# Patient Record
Sex: FEMALE | Race: WHITE | NOT HISPANIC OR LATINO | Employment: OTHER | URBAN - METROPOLITAN AREA
[De-identification: names, ages, dates, MRNs, and addresses within clinical notes are randomized per-mention and may not be internally consistent; named-entity substitution may affect disease eponyms.]

---

## 2017-09-21 PROBLEM — R10.9 ABDOMINAL PAIN: Status: ACTIVE | Noted: 2017-09-21

## 2017-09-22 PROBLEM — E83.52 HYPERCALCEMIA: Status: ACTIVE | Noted: 2017-09-22

## 2018-03-13 PROBLEM — R29.6 FREQUENT FALLS: Status: ACTIVE | Noted: 2018-03-13

## 2018-03-13 PROBLEM — I95.9 HYPOTENSION: Status: ACTIVE | Noted: 2018-03-13

## 2018-03-13 PROBLEM — R55 SYNCOPE: Status: ACTIVE | Noted: 2018-03-13

## 2018-03-13 PROBLEM — N17.9 AKI (ACUTE KIDNEY INJURY): Status: ACTIVE | Noted: 2018-03-13
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
## 2018-04-10 PROBLEM — R53.1 WEAKNESS: Status: ACTIVE | Noted: 2018-04-10

## 2018-04-25 PROBLEM — R00.1 BRADYCARDIA: Status: ACTIVE | Noted: 2018-04-25

## 2018-04-27 PROBLEM — I48.0 PAROXYSMAL ATRIAL FIBRILLATION: Status: ACTIVE | Noted: 2018-04-27

## 2018-04-30 PROBLEM — I25.10 CAD (CORONARY ARTERY DISEASE): Status: ACTIVE | Noted: 2018-04-30

## 2018-05-02 PROBLEM — Z53.1 REFUSAL OF BLOOD TRANSFUSIONS AS PATIENT IS JEHOVAH'S WITNESS: Status: ACTIVE | Noted: 2018-05-02

## 2018-05-04 ENCOUNTER — HOSPITAL ENCOUNTER (INPATIENT)
Facility: HOSPITAL | Age: 78
LOS: 6 days | Discharge: HOME | DRG: 247 | End: 2018-05-10
Attending: HOSPITALIST | Admitting: HOSPITALIST
Payer: MEDICARE

## 2018-05-04 DIAGNOSIS — I10 ESSENTIAL HYPERTENSION: Chronic | ICD-10-CM

## 2018-05-04 DIAGNOSIS — I25.10 CAD (CORONARY ARTERY DISEASE): ICD-10-CM

## 2018-05-04 DIAGNOSIS — N18.2 TYPE 2 DIABETES MELLITUS WITH STAGE 2 CHRONIC KIDNEY DISEASE, WITHOUT LONG-TERM CURRENT USE OF INSULIN: Chronic | ICD-10-CM

## 2018-05-04 DIAGNOSIS — I25.10 CORONARY ARTERY DISEASE INVOLVING NATIVE CORONARY ARTERY OF NATIVE HEART WITHOUT ANGINA PECTORIS: ICD-10-CM

## 2018-05-04 DIAGNOSIS — R07.89 CHEST DISCOMFORT: ICD-10-CM

## 2018-05-04 DIAGNOSIS — G89.29 CHRONIC BILATERAL LOW BACK PAIN, WITH SCIATICA PRESENCE UNSPECIFIED: Chronic | ICD-10-CM

## 2018-05-04 DIAGNOSIS — I48.92 ATRIAL FLUTTER: ICD-10-CM

## 2018-05-04 DIAGNOSIS — M54.5 CHRONIC BILATERAL LOW BACK PAIN, WITH SCIATICA PRESENCE UNSPECIFIED: Chronic | ICD-10-CM

## 2018-05-04 DIAGNOSIS — E11.22 TYPE 2 DIABETES MELLITUS WITH STAGE 2 CHRONIC KIDNEY DISEASE, WITHOUT LONG-TERM CURRENT USE OF INSULIN: Chronic | ICD-10-CM

## 2018-05-04 DIAGNOSIS — E78.49 OTHER HYPERLIPIDEMIA: Primary | ICD-10-CM

## 2018-05-04 DIAGNOSIS — I48.0 PAROXYSMAL A-FIB: ICD-10-CM

## 2018-05-04 DIAGNOSIS — E78.5 HYPERLIPIDEMIA, UNSPECIFIED HYPERLIPIDEMIA TYPE: ICD-10-CM

## 2018-05-04 LAB
ABO + RH BLD: NORMAL
ALBUMIN SERPL BCP-MCNC: 3.1 G/DL
ALP SERPL-CCNC: 85 U/L
ALT SERPL W/O P-5'-P-CCNC: 14 U/L
ANION GAP SERPL CALC-SCNC: 7 MMOL/L
AST SERPL-CCNC: 13 U/L
BASOPHILS # BLD AUTO: 0.06 K/UL
BASOPHILS NFR BLD: 0.6 %
BILIRUB SERPL-MCNC: 1.1 MG/DL
BLD GP AB SCN CELLS X3 SERPL QL: NORMAL
BNP SERPL-MCNC: 82 PG/ML
BUN SERPL-MCNC: 28 MG/DL
CALCIUM SERPL-MCNC: 9.8 MG/DL
CHLORIDE SERPL-SCNC: 105 MMOL/L
CHOLEST SERPL-MCNC: 113 MG/DL
CHOLEST/HDLC SERPL: 2.8 {RATIO}
CO2 SERPL-SCNC: 28 MMOL/L
CREAT SERPL-MCNC: 1 MG/DL
DIFFERENTIAL METHOD: ABNORMAL
EOSINOPHIL # BLD AUTO: 0.2 K/UL
EOSINOPHIL NFR BLD: 2.4 %
ERYTHROCYTE [DISTWIDTH] IN BLOOD BY AUTOMATED COUNT: 14 %
EST. GFR  (AFRICAN AMERICAN): >60 ML/MIN/1.73 M^2
EST. GFR  (NON AFRICAN AMERICAN): 54.9 ML/MIN/1.73 M^2
ESTIMATED AVG GLUCOSE: 111 MG/DL
ESTIMATED PA SYSTOLIC PRESSURE: 27.8
FACT X PPP CHRO-ACNC: 0.16 IU/ML
FACT X PPP CHRO-ACNC: 0.22 IU/ML
FACT X PPP CHRO-ACNC: 0.42 IU/ML
GLUCOSE SERPL-MCNC: 119 MG/DL
HBA1C MFR BLD HPLC: 5.5 %
HCT VFR BLD AUTO: 35.9 %
HDLC SERPL-MCNC: 40 MG/DL
HDLC SERPL: 35.4 %
HGB BLD-MCNC: 11.2 G/DL
IMM GRANULOCYTES # BLD AUTO: 0.03 K/UL
IMM GRANULOCYTES NFR BLD AUTO: 0.3 %
INR PPP: 1
LDLC SERPL CALC-MCNC: 47.4 MG/DL
LYMPHOCYTES # BLD AUTO: 4.2 K/UL
LYMPHOCYTES NFR BLD: 43.9 %
MAGNESIUM SERPL-MCNC: 1.8 MG/DL
MCH RBC QN AUTO: 29.2 PG
MCHC RBC AUTO-ENTMCNC: 31.2 G/DL
MCV RBC AUTO: 94 FL
MITRAL VALVE REGURGITATION: NORMAL
MONOCYTES # BLD AUTO: 1.1 K/UL
MONOCYTES NFR BLD: 11.7 %
NEUTROPHILS # BLD AUTO: 4 K/UL
NEUTROPHILS NFR BLD: 41.1 %
NONHDLC SERPL-MCNC: 73 MG/DL
NRBC BLD-RTO: 0 /100 WBC
PHOSPHATE SERPL-MCNC: 3.6 MG/DL
PLATELET # BLD AUTO: 291 K/UL
PMV BLD AUTO: 11.7 FL
POCT GLUCOSE: 120 MG/DL (ref 70–110)
POCT GLUCOSE: 137 MG/DL (ref 70–110)
POCT GLUCOSE: 137 MG/DL (ref 70–110)
POCT GLUCOSE: 159 MG/DL (ref 70–110)
POTASSIUM SERPL-SCNC: 4 MMOL/L
PROT SERPL-MCNC: 5.7 G/DL
PROTHROMBIN TIME: 10.9 SEC
RBC # BLD AUTO: 3.84 M/UL
RETIRED EF AND QEF - SEE NOTES: 68 (ref 55–65)
SODIUM SERPL-SCNC: 140 MMOL/L
T4 FREE SERPL-MCNC: 1.19 NG/DL
TRICUSPID VALVE REGURGITATION: NORMAL
TRIGL SERPL-MCNC: 128 MG/DL
TROPONIN I SERPL DL<=0.01 NG/ML-MCNC: <0.006 NG/ML
TSH SERPL DL<=0.005 MIU/L-ACNC: 0.95 UIU/ML
WBC # BLD AUTO: 9.62 K/UL

## 2018-05-04 PROCEDURE — 85610 PROTHROMBIN TIME: CPT

## 2018-05-04 PROCEDURE — 99223 1ST HOSP IP/OBS HIGH 75: CPT | Mod: AI,,, | Performed by: HOSPITALIST

## 2018-05-04 PROCEDURE — 86850 RBC ANTIBODY SCREEN: CPT

## 2018-05-04 PROCEDURE — 93306 TTE W/DOPPLER COMPLETE: CPT | Mod: 26,,, | Performed by: INTERNAL MEDICINE

## 2018-05-04 PROCEDURE — 84443 ASSAY THYROID STIM HORMONE: CPT

## 2018-05-04 PROCEDURE — 25000003 PHARM REV CODE 250: Performed by: NURSE PRACTITIONER

## 2018-05-04 PROCEDURE — 93010 ELECTROCARDIOGRAM REPORT: CPT | Mod: ,,, | Performed by: INTERNAL MEDICINE

## 2018-05-04 PROCEDURE — 84439 ASSAY OF FREE THYROXINE: CPT

## 2018-05-04 PROCEDURE — 36415 COLL VENOUS BLD VENIPUNCTURE: CPT

## 2018-05-04 PROCEDURE — 25000003 PHARM REV CODE 250: Performed by: HOSPITALIST

## 2018-05-04 PROCEDURE — 83880 ASSAY OF NATRIURETIC PEPTIDE: CPT

## 2018-05-04 PROCEDURE — 93306 TTE W/DOPPLER COMPLETE: CPT

## 2018-05-04 PROCEDURE — 83735 ASSAY OF MAGNESIUM: CPT

## 2018-05-04 PROCEDURE — 85025 COMPLETE CBC W/AUTO DIFF WBC: CPT

## 2018-05-04 PROCEDURE — 83036 HEMOGLOBIN GLYCOSYLATED A1C: CPT

## 2018-05-04 PROCEDURE — 84100 ASSAY OF PHOSPHORUS: CPT

## 2018-05-04 PROCEDURE — 85520 HEPARIN ASSAY: CPT | Mod: 91

## 2018-05-04 PROCEDURE — 84484 ASSAY OF TROPONIN QUANT: CPT

## 2018-05-04 PROCEDURE — 80053 COMPREHEN METABOLIC PANEL: CPT

## 2018-05-04 PROCEDURE — 63600175 PHARM REV CODE 636 W HCPCS: Performed by: HOSPITALIST

## 2018-05-04 PROCEDURE — 80061 LIPID PANEL: CPT

## 2018-05-04 PROCEDURE — 25000003 PHARM REV CODE 250: Performed by: STUDENT IN AN ORGANIZED HEALTH CARE EDUCATION/TRAINING PROGRAM

## 2018-05-04 PROCEDURE — 20600001 HC STEP DOWN PRIVATE ROOM

## 2018-05-04 PROCEDURE — 93005 ELECTROCARDIOGRAM TRACING: CPT

## 2018-05-04 RX ORDER — PANTOPRAZOLE SODIUM 40 MG/1
40 TABLET, DELAYED RELEASE ORAL DAILY
Status: DISCONTINUED | OUTPATIENT
Start: 2018-05-04 | End: 2018-05-10 | Stop reason: HOSPADM

## 2018-05-04 RX ORDER — DIPHENHYDRAMINE HCL 50 MG
50 CAPSULE ORAL EVERY 6 HOURS
Status: COMPLETED | OUTPATIENT
Start: 2018-05-06 | End: 2018-05-07

## 2018-05-04 RX ORDER — IPRATROPIUM BROMIDE AND ALBUTEROL SULFATE 2.5; .5 MG/3ML; MG/3ML
3 SOLUTION RESPIRATORY (INHALATION) EVERY 4 HOURS PRN
Status: DISCONTINUED | OUTPATIENT
Start: 2018-05-04 | End: 2018-05-10 | Stop reason: HOSPADM

## 2018-05-04 RX ORDER — CARVEDILOL 12.5 MG/1
12.5 TABLET ORAL 2 TIMES DAILY
Status: DISCONTINUED | OUTPATIENT
Start: 2018-05-04 | End: 2018-05-05

## 2018-05-04 RX ORDER — OXYBUTYNIN CHLORIDE 5 MG/1
5 TABLET, EXTENDED RELEASE ORAL
Status: DISCONTINUED | OUTPATIENT
Start: 2018-05-04 | End: 2018-05-10 | Stop reason: HOSPADM

## 2018-05-04 RX ORDER — METOPROLOL SUCCINATE 25 MG/1
50 TABLET, EXTENDED RELEASE ORAL DAILY
Status: DISCONTINUED | OUTPATIENT
Start: 2018-05-04 | End: 2018-05-04

## 2018-05-04 RX ORDER — LISINOPRIL 20 MG/1
20 TABLET ORAL DAILY
Status: DISCONTINUED | OUTPATIENT
Start: 2018-05-04 | End: 2018-05-07

## 2018-05-04 RX ORDER — GABAPENTIN 300 MG/1
300 CAPSULE ORAL 3 TIMES DAILY
Status: DISCONTINUED | OUTPATIENT
Start: 2018-05-04 | End: 2018-05-04

## 2018-05-04 RX ORDER — CLOPIDOGREL 300 MG/1
600 TABLET, FILM COATED ORAL ONCE
Status: COMPLETED | OUTPATIENT
Start: 2018-05-04 | End: 2018-05-04

## 2018-05-04 RX ORDER — HEPARIN SODIUM,PORCINE/D5W 25000/250
12 INTRAVENOUS SOLUTION INTRAVENOUS CONTINUOUS
Status: DISCONTINUED | OUTPATIENT
Start: 2018-05-04 | End: 2018-05-10

## 2018-05-04 RX ORDER — TIZANIDINE 4 MG/1
4 TABLET ORAL 3 TIMES DAILY PRN
Status: DISCONTINUED | OUTPATIENT
Start: 2018-05-04 | End: 2018-05-10 | Stop reason: HOSPADM

## 2018-05-04 RX ORDER — CLOPIDOGREL BISULFATE 75 MG/1
75 TABLET ORAL DAILY
Status: DISCONTINUED | OUTPATIENT
Start: 2018-05-05 | End: 2018-05-10 | Stop reason: HOSPADM

## 2018-05-04 RX ORDER — ASPIRIN 81 MG/1
81 TABLET ORAL DAILY
Status: DISCONTINUED | OUTPATIENT
Start: 2018-05-04 | End: 2018-05-10 | Stop reason: HOSPADM

## 2018-05-04 RX ORDER — FAMOTIDINE 20 MG/1
20 TABLET, FILM COATED ORAL 2 TIMES DAILY
Status: COMPLETED | OUTPATIENT
Start: 2018-05-06 | End: 2018-05-07

## 2018-05-04 RX ORDER — INSULIN ASPART 100 [IU]/ML
0-5 INJECTION, SOLUTION INTRAVENOUS; SUBCUTANEOUS EVERY 6 HOURS PRN
Status: DISCONTINUED | OUTPATIENT
Start: 2018-05-04 | End: 2018-05-10 | Stop reason: HOSPADM

## 2018-05-04 RX ORDER — CETIRIZINE HYDROCHLORIDE 5 MG/1
10 TABLET ORAL DAILY
Status: DISCONTINUED | OUTPATIENT
Start: 2018-05-04 | End: 2018-05-10 | Stop reason: HOSPADM

## 2018-05-04 RX ORDER — GABAPENTIN 400 MG/1
800 CAPSULE ORAL 4 TIMES DAILY
Status: DISCONTINUED | OUTPATIENT
Start: 2018-05-04 | End: 2018-05-09

## 2018-05-04 RX ORDER — ATORVASTATIN CALCIUM 10 MG/1
10 TABLET, FILM COATED ORAL NIGHTLY
Status: DISCONTINUED | OUTPATIENT
Start: 2018-05-04 | End: 2018-05-04

## 2018-05-04 RX ORDER — ATORVASTATIN CALCIUM 20 MG/1
40 TABLET, FILM COATED ORAL NIGHTLY
Status: DISCONTINUED | OUTPATIENT
Start: 2018-05-04 | End: 2018-05-10 | Stop reason: HOSPADM

## 2018-05-04 RX ORDER — AMOXICILLIN 250 MG
2 CAPSULE ORAL 2 TIMES DAILY PRN
Status: DISCONTINUED | OUTPATIENT
Start: 2018-05-04 | End: 2018-05-10 | Stop reason: HOSPADM

## 2018-05-04 RX ORDER — HEPARIN SODIUM 5000 [USP'U]/ML
5000 INJECTION, SOLUTION INTRAVENOUS; SUBCUTANEOUS EVERY 8 HOURS
Status: DISCONTINUED | OUTPATIENT
Start: 2018-05-04 | End: 2018-05-04

## 2018-05-04 RX ORDER — GLUCAGON 1 MG
1 KIT INJECTION
Status: DISCONTINUED | OUTPATIENT
Start: 2018-05-04 | End: 2018-05-10 | Stop reason: HOSPADM

## 2018-05-04 RX ORDER — IBUPROFEN 200 MG
24 TABLET ORAL
Status: DISCONTINUED | OUTPATIENT
Start: 2018-05-04 | End: 2018-05-10 | Stop reason: HOSPADM

## 2018-05-04 RX ORDER — ACETAMINOPHEN 325 MG/1
650 TABLET ORAL EVERY 4 HOURS PRN
Status: DISCONTINUED | OUTPATIENT
Start: 2018-05-04 | End: 2018-05-09

## 2018-05-04 RX ORDER — CHOLECALCIFEROL (VITAMIN D3) 25 MCG
1000 TABLET ORAL DAILY
Status: DISCONTINUED | OUTPATIENT
Start: 2018-05-04 | End: 2018-05-10 | Stop reason: HOSPADM

## 2018-05-04 RX ORDER — SODIUM CHLORIDE 0.9 % (FLUSH) 0.9 %
5 SYRINGE (ML) INJECTION
Status: DISCONTINUED | OUTPATIENT
Start: 2018-05-04 | End: 2018-05-10 | Stop reason: HOSPADM

## 2018-05-04 RX ORDER — IBUPROFEN 200 MG
16 TABLET ORAL
Status: DISCONTINUED | OUTPATIENT
Start: 2018-05-04 | End: 2018-05-10 | Stop reason: HOSPADM

## 2018-05-04 RX ORDER — ONDANSETRON 8 MG/1
8 TABLET, ORALLY DISINTEGRATING ORAL EVERY 8 HOURS PRN
Status: DISCONTINUED | OUTPATIENT
Start: 2018-05-04 | End: 2018-05-10 | Stop reason: HOSPADM

## 2018-05-04 RX ORDER — CITALOPRAM 10 MG/1
10 TABLET ORAL DAILY
Status: DISCONTINUED | OUTPATIENT
Start: 2018-05-04 | End: 2018-05-10 | Stop reason: HOSPADM

## 2018-05-04 RX ADMIN — OXYBUTYNIN CHLORIDE 5 MG: 5 TABLET, FILM COATED, EXTENDED RELEASE ORAL at 05:05

## 2018-05-04 RX ADMIN — VITAMIN D, TAB 1000IU (100/BT) 1000 UNITS: 25 TAB at 09:05

## 2018-05-04 RX ADMIN — CLOPIDOGREL BISULFATE 600 MG: 300 TABLET, FILM COATED ORAL at 08:05

## 2018-05-04 RX ADMIN — ASPIRIN 81 MG: 81 TABLET, COATED ORAL at 09:05

## 2018-05-04 RX ADMIN — ATORVASTATIN CALCIUM 40 MG: 20 TABLET, FILM COATED ORAL at 08:05

## 2018-05-04 RX ADMIN — GABAPENTIN 800 MG: 400 CAPSULE ORAL at 05:05

## 2018-05-04 RX ADMIN — CITALOPRAM HYDROBROMIDE 10 MG: 10 TABLET ORAL at 09:05

## 2018-05-04 RX ADMIN — GABAPENTIN 800 MG: 400 CAPSULE ORAL at 08:05

## 2018-05-04 RX ADMIN — TIZANIDINE 4 MG: 4 TABLET ORAL at 05:05

## 2018-05-04 RX ADMIN — STANDARDIZED SENNA CONCENTRATE AND DOCUSATE SODIUM 2 TABLET: 8.6; 5 TABLET, FILM COATED ORAL at 09:05

## 2018-05-04 RX ADMIN — CETIRIZINE HYDROCHLORIDE 10 MG: 5 TABLET, FILM COATED ORAL at 09:05

## 2018-05-04 RX ADMIN — CARVEDILOL 12.5 MG: 12.5 TABLET, FILM COATED ORAL at 09:05

## 2018-05-04 RX ADMIN — ACETAMINOPHEN 650 MG: 325 TABLET ORAL at 09:05

## 2018-05-04 RX ADMIN — PANTOPRAZOLE SODIUM 40 MG: 40 TABLET, DELAYED RELEASE ORAL at 09:05

## 2018-05-04 RX ADMIN — THERA TABS 1 TABLET: TAB at 09:05

## 2018-05-04 RX ADMIN — LISINOPRIL 20 MG: 20 TABLET ORAL at 09:05

## 2018-05-04 RX ADMIN — HEPARIN SODIUM 12 UNITS/KG/HR: 10000 INJECTION, SOLUTION INTRAVENOUS at 06:05

## 2018-05-04 RX ADMIN — GABAPENTIN 300 MG: 300 CAPSULE ORAL at 09:05

## 2018-05-04 RX ADMIN — CARVEDILOL 12.5 MG: 12.5 TABLET, FILM COATED ORAL at 08:05

## 2018-05-04 RX ADMIN — HEPARIN SODIUM 14 UNITS/KG/HR: 10000 INJECTION, SOLUTION INTRAVENOUS at 04:05

## 2018-05-04 NOTE — PLAN OF CARE
05/04/18 1112   Discharge Assessment   Assessment Type Discharge Planning Assessment   Confirmed/corrected address and phone number on facesheet? Yes   Assessment information obtained from? Patient;Medical Record   Expected Length of Stay (days) 4   Communicated expected length of stay with patient/caregiver yes   Prior to hospitilization cognitive status: Alert/Oriented   Prior to hospitalization functional status: Assistive Equipment;Needs Assistance   Current cognitive status: Alert/Oriented   Current Functional Status: Assistive Equipment;Needs Assistance   Facility Arrived From: [facility] With sibling(s)   Able to Return to Prior Arrangements yes   Is patient able to care for self after discharge? Yes   Patient's perception of discharge disposition home or selfcare   Readmission Within The Last 30 Days no previous admission in last 30 days   Patient currently being followed by outpatient case management? No   Patient currently receives any other outside agency services? No   Equipment Currently Used at Home rollator;cane, straight;wheelchair;bedside commode;raised toilet;grab bar   Do you have any problems affording any of your prescribed medications? No   Is the patient taking medications as prescribed? yes   Does the patient have transportation home? Yes   Transportation Available family or friend will provide   Does the patient receive services at the [facility]? No   Discharge Plan A Home with family   Discharge Plan B Home with family;Home Health   Patient/Family In Agreement With Plan yes   Transferred from [facility] for CAD-CTS evaluation. Lives with sister and requires assistance in her ADLs. Plan is to DC home. No DC needs identified at this time.

## 2018-05-04 NOTE — PLAN OF CARE
Problem: Patient Care Overview  Goal: Plan of Care Review  Outcome: Revised  Pt free of falls/traumas/injuries. Denies c/o SOB or CP.  Chronic back pain and neuropathy managed with PO analgesics and Neurontin. Heparin gtt infusing for parox afib; pt remains sinus bimal.  Toprol XL switched to Coreg BID; BP and HR remain stable.  Blood glucose diet controlled; no supplemental insulin required.  2D echo performed; results as listed in chart.  CTS and Interventional Cardiology following.  Family at the bedside.  Pt and family tolerating plan of care.

## 2018-05-04 NOTE — NURSING
Pt arrived via [facility] at 0322am  No c/o noted at this time will page MD for further orders.

## 2018-05-04 NOTE — SUBJECTIVE & OBJECTIVE
Interval History: Looking forward to procedure to fix her heart.  Only c/o is back pain.  Patient had episode of hypotension post coreg administration.  Bolused with IVF 500ml x 2.     Review of Systems   Constitutional: Negative for activity change, appetite change and fatigue.   HENT: Negative for congestion, sore throat and trouble swallowing.    Respiratory: Negative for cough and shortness of breath.    Cardiovascular: Negative for chest pain, palpitations and leg swelling.   Gastrointestinal: Negative for abdominal pain, constipation, diarrhea, nausea and vomiting.   Genitourinary: Negative for decreased urine volume, difficulty urinating and hematuria.   Musculoskeletal: Positive for back pain. Negative for myalgias.   Neurological: Negative for dizziness, weakness, light-headedness, numbness and headaches.   Hematological: Does not bruise/bleed easily.   Psychiatric/Behavioral: Negative for agitation, decreased concentration and sleep disturbance. The patient is not nervous/anxious.      Objective:     Vital Signs (Most Recent):  Temp: 98.3 °F (36.8 °C) (05/04/18 0805)  Pulse: (!) 52 (05/04/18 0805)  Resp: 18 (05/04/18 0805)  BP: (!) 160/71 (05/04/18 0805)  SpO2: 98 % (05/04/18 0805) Vital Signs (24h Range):  Temp:  [97.2 °F (36.2 °C)-98.3 °F (36.8 °C)] 98.3 °F (36.8 °C)  Pulse:  [52-63] 52  Resp:  [18-19] 18  SpO2:  [96 %-98 %] 98 %  BP: (134-164)/(64-76) 160/71     Weight: 80.6 kg (177 lb 9.6 oz)  Body mass index is 27 kg/m².  No intake or output data in the 24 hours ending 05/04/18 0900   Physical Exam   Constitutional: She is oriented to person, place, and time. She appears well-developed and well-nourished. No distress.   HENT:   Head: Normocephalic and atraumatic.   Right Ear: External ear normal.   Left Ear: External ear normal.   Nose: Nose normal.   Eyes: Conjunctivae and EOM are normal.   Neck: Normal range of motion. Neck supple. No JVD present.   Cardiovascular: Normal rate, regular rhythm,  normal heart sounds and intact distal pulses.    No murmur heard.  Pulmonary/Chest: Effort normal and breath sounds normal. No respiratory distress. She has no wheezes. She has no rales.   Abdominal: Soft. Bowel sounds are normal. She exhibits no distension and no mass. There is no tenderness. There is no guarding.   Musculoskeletal: Normal range of motion. She exhibits no edema.   Neurological: She is alert and oriented to person, place, and time. No cranial nerve deficit or sensory deficit. Coordination normal.   Skin: Skin is warm and dry. Capillary refill takes 2 to 3 seconds. No rash noted. She is not diaphoretic. No erythema.   Psychiatric: She has a normal mood and affect. Her behavior is normal. Judgment and thought content normal.   Nursing note and vitals reviewed.      Significant Labs:   CBC:   Recent Labs  Lab 05/03/18  0448 05/04/18  0528   WBC 11.00 9.62   HGB 11.9* 11.2*   HCT 35.0* 35.9*    291     CMP:   Recent Labs  Lab 05/03/18 0448 05/04/18  0529    140   K 3.9 4.0    105   CO2 30* 28   * 119*   BUN 29* 28*   CREATININE 1.00 1.0   CALCIUM 9.4 9.8   PROT 5.8* 5.7*   ALBUMIN 3.5 3.1*   BILITOT 1.0 1.1*   ALKPHOS 86 85   AST 19 13   ALT 32 14   ANIONGAP  --  7*   EGFRNONAA 55* 54.9*       Significant Imaging: I have reviewed all pertinent imaging results/findings within the past 24 hours.     2d cfd echo: 5/4/18   1 - Upper limit of normal left ventricular enlargement.     2 - Normal left ventricular systolic function (EF 65-70%).     3 - Severe left atrial enlargement.     4 - No wall motion abnormalities.     5 - Indeterminate LV diastolic function.     6 - Normal right ventricular systolic function .     7 - Trivial to mild mitral regurgitation.     8 - Trivial to mild tricuspid regurgitation.     9 - The estimated PA systolic pressure is 28 mmHg.

## 2018-05-04 NOTE — PROGRESS NOTES
Tele box for room 340 is not available at this time.  Charge nurse notified and trying to track down tele box at this time.

## 2018-05-04 NOTE — H&P
[facility] Hospital Medicine  History & Physical    Patient Name: Elías Muñoz  MRN: [id number]  Admission Date: 5/4/2018  Attending Physician: Al Montes MD   Primary Care Provider: Calin Casas MD    [facility] Medicine Team: [facility]OSP MED  Reuben Alaniz DO     Patient information was obtained from patient, outside records  and ER records.     Subjective:     Principal Problem:CAD (coronary artery disease)    Chief Complaint: No chief complaint on file.       HPI:   78 year old female with h/o HTN, HLD, DM2, paroxysmal afib, CAD, lumbar DJD with chronic low back pain, fall risk due to unsteady gait, breast cancer s/p left mastectomy who is a [employer]. She was admitted to [facility] on 04/25 from clinic with asymptomatic hypotension and bradycardia. Patients in clinic her BP was 60/30 but she felt fine.  EKG on admission showed sinus bradycardia with HR 42 and first degree ABV. Metoprolol was held due to bradycardia. No chest pain and SOB. Troponin negative. Patient developed afib w RVR on 04/27 and started on BB, NOAC pradaxa. Patient was evaluated by cardiology while in hospital for bradycardia and then afib w RVR.      Patient had elective diagnostic LHC on 04/30 which revealed multivessel disease including left main artery, LAD, CFx and RCA. Normal LVSF     Case discussed with Dr. Germain from [facility] and deemed patient is not a CABG candidate due to anatomy. Dr. Germain reviewed the [facility] with Dr. Jose Tellez from interventional cardiology and suggested transfer to [facility] for high risk PCI.      Patient currently on heparin infusion since [facility] w/o evidence of bleeding. She is a [employer] and refusing any blood product transfusion. She has iodine allergy and received prednisone prior to recent [facility] w/o any adverse reaction.               To do list upon patient arrival:  - ADMIT TO [facility]/  - continue GTMT for CAD   - 2D ECHO W Doppler   - telemetry monitoring   - continue  heparin infusion for paroxysmal afib   - interventional cardiology consult for high risk PCI      Past Medical History:   Diagnosis Date    ADARSH (acute kidney injury)     Anticoagulant long-term use     Arthritis     Atrial fibrillation     Atrial flutter     s/p cardioversion with no recurrence    CAD (coronary artery disease) 4/30/2018    Carpal tunnel syndrome     Cataract     CHF (congestive heart failure)     Chronic low back pain     CTS (carpal tunnel syndrome)     Depression     Diabetes mellitus, type 2     Diastolic heart failure     Disorder of kidney and ureter     GERD (gastroesophageal reflux disease)     Hyperlipidemia     Hypertension     Hypotension     Malignant neoplasm of central portion of female breast 8/19/2014    Neuropathy     Stress incontinence     Stress incontinence     Syncope     Syncope        Past Surgical History:   Procedure Laterality Date    ADENOIDECTOMY      BACK SURGERY      BREAST BIOPSY Left     malignant    CATARACT EXTRACTION Left     CHOLECYSTECTOMY      EYE SURGERY Bilateral     cataract    INCONTINENCE SURGERY      MASTECTOMY, RADICAL Left     PORTACATH PLACEMENT Right     TONSILLECTOMY         Review of patient's allergies indicates:   Allergen Reactions    Iodine and iodide containing products Anaphylaxis    Latex, natural rubber Rash    Adhesive      Takes skin off     Morphine      Makes her sick    Penicillins Other (See Comments)    Betadine [povidone-iodine] Rash    Iodinated contrast- oral and iv dye Rash    Lidoderm [lidocaine] Rash     Adhesives on patch not lidocaine       Current Facility-Administered Medications on File Prior to Encounter   Medication    [DISCONTINUED] acetaminophen tablet 650 mg    [DISCONTINUED] albuterol-ipratropium 2.5mg-0.5mg/3mL nebulizer solution 3 mL    [DISCONTINUED] aspirin EC tablet 81 mg    [DISCONTINUED] atorvastatin tablet 40 mg    [DISCONTINUED] citalopram tablet 10 mg     [DISCONTINUED] dextrose 50% injection 12.5 g    [DISCONTINUED] dextrose 50% injection 25 g    [DISCONTINUED] famotidine tablet 20 mg    [DISCONTINUED] famotidine tablet 20 mg    [DISCONTINUED] glucagon (human recombinant) injection 1 mg    [DISCONTINUED] glucose chewable tablet 16 g    [DISCONTINUED] glucose chewable tablet 24 g    [DISCONTINUED] heparin 25,000 units in dextrose 5% (100 units/ml) IV bolus from bag; PRN BOLUS    [DISCONTINUED] heparin 25,000 units in dextrose 5% (100 units/ml) IV bolus from bag; PRN BOLUS    [DISCONTINUED] heparin 25,000 units in dextrose 5% 250 mL (100 units/mL) infusion; FEMALE    [DISCONTINUED] hydrocodone-acetaminophen 10-325mg per tablet 1 tablet    [DISCONTINUED] insulin aspart U-100 injection 1-10 Units    [DISCONTINUED] lisinopril tablet 20 mg    [DISCONTINUED] magnesium oxide tablet 800 mg    [DISCONTINUED] magnesium oxide tablet 800 mg    [DISCONTINUED] metoprolol succinate (TOPROL-XL) 24 hr tablet 50 mg    [DISCONTINUED] ondansetron disintegrating tablet 4 mg    [DISCONTINUED] ondansetron injection 8 mg    [DISCONTINUED] pantoprazole EC tablet 40 mg    [DISCONTINUED] potassium chloride 10% oral solution 40 mEq    [DISCONTINUED] potassium chloride 10% oral solution 40 mEq    [DISCONTINUED] potassium chloride 10% oral solution 40 mEq    [DISCONTINUED] potassium, sodium phosphates 280-160-250 mg packet 2 packet    [DISCONTINUED] potassium, sodium phosphates 280-160-250 mg packet 2 packet    [DISCONTINUED] potassium, sodium phosphates 280-160-250 mg packet 2 packet    [DISCONTINUED] predniSONE tablet 50 mg    [DISCONTINUED] promethazine (PHENERGAN) 6.25 mg in dextrose 5 % 50 mL IVPB    [DISCONTINUED] senna-docusate 8.6-50 mg per tablet 1 tablet    [DISCONTINUED] sodium chloride 0.9% flush 5 mL     Current Outpatient Prescriptions on File Prior to Encounter   Medication Sig    aspirin (ECOTRIN) 81 MG EC tablet Take 1 tablet (81 mg total) by mouth  once daily.    atorvastatin (LIPITOR) 10 MG tablet TAKE 1 TABLET BY MOUTH EVERY EVENING (Patient taking differently: TAKE 1 TABLET(10mg)BY MOUTH EVERY EVENING)    citalopram (CELEXA) 10 MG tablet Take 1 tablet (10 mg total) by mouth once daily.    gabapentin (NEURONTIN) 800 MG tablet Take 1 tablet (800 mg total) by mouth 4 (four) times daily.    hydrocodone-acetaminophen 10-325mg (NORCO)  mg Tab Take 1 tablet by mouth every 6 (six) hours as needed for Pain (breakthrough pain only).    lisinopril (PRINIVIL,ZESTRIL) 20 MG tablet Take 1 tablet (20 mg total) by mouth once daily.    loratadine (CLARITIN) 10 mg tablet Take 1 tablet (10 mg total) by mouth once daily.    metoprolol succinate (TOPROL-XL) 50 MG 24 hr tablet Take 1 tablet (50 mg total) by mouth once daily.    multivitamin capsule Take 1 capsule by mouth once daily.    omeprazole (PRILOSEC) 40 MG capsule Take 1 capsule (40 mg total) by mouth once daily.    ondansetron (ZOFRAN-ODT) 4 MG TbDL Take 1 tablet (4 mg total) by mouth every 6 (six) hours as needed.    oxybutynin (DITROPAN-XL) 5 MG TR24 TAKE 1 TABLET BY MOUTH EVERY DAY AFTER DINNER (Patient taking differently: TAKE 1 TABLET(5mg) BY MOUTH EVERY DAY AFTER DINNER)    tiZANidine (ZANAFLEX) 4 MG tablet TAKE 1 TABLET BY MOUTH THREE TIMES DAILY AS NEEDED FOR MUSCLE SPASMS (Patient taking differently: TAKE 1 TABLET(4mg)BY MOUTH THREE TIMES DAILY AS NEEDED FOR MUSCLE SPASMS)    traMADol (ULTRAM) 50 mg tablet Take 1 tablet (50 mg total) by mouth every 6 (six) hours as needed.    vitamin D 1000 units Tab Take 1,000 Units by mouth once daily.     Family History     Problem Relation (Age of Onset)    Cancer Sister    Diabetes Father, Brother    Heart disease Mother, Father    Hyperlipidemia Brother    Hypertension Brother    Thyroid disease Sister        Social History Main Topics    Smoking status: Former Smoker     Packs/day: 1.00     Years: 43.00     Types: Cigarettes     Start date: 9/19/1957      Quit date: 9/19/2010    Smokeless tobacco: Never Used    Alcohol use No    Drug use: No    Sexual activity: No     Review of Systems   Constitutional: Negative for activity change, appetite change, chills, diaphoresis, fatigue and fever.   HENT: Negative for congestion, dental problem, drooling, ear discharge, ear pain, facial swelling, hearing loss, mouth sores, nosebleeds, postnasal drip, rhinorrhea, sinus pressure, sneezing, sore throat, tinnitus, trouble swallowing and voice change.    Eyes: Negative for photophobia, pain, discharge, redness, itching and visual disturbance.   Respiratory: Negative for apnea, cough, choking, chest tightness, shortness of breath, wheezing and stridor.    Cardiovascular: Negative for chest pain, palpitations and leg swelling.   Gastrointestinal: Negative for abdominal distention, abdominal pain, anal bleeding, blood in stool, constipation, diarrhea, nausea, rectal pain and vomiting.   Endocrine: Negative for cold intolerance, heat intolerance, polydipsia, polyphagia and polyuria.   Genitourinary: Negative for decreased urine volume, difficulty urinating, dyspareunia, dysuria, enuresis, flank pain, frequency, genital sores, hematuria, menstrual problem, pelvic pain, urgency, vaginal bleeding, vaginal discharge and vaginal pain.   Musculoskeletal: Positive for back pain (chronic low back pain ). Negative for arthralgias, joint swelling, myalgias, neck pain and neck stiffness. Gait problem: unsteady gait.   Skin: Negative for color change, pallor, rash and wound.   Allergic/Immunologic: Negative for environmental allergies, food allergies and immunocompromised state.   Neurological: Negative for dizziness, tremors, seizures, syncope, facial asymmetry, speech difficulty, weakness, light-headedness, numbness and headaches.   Hematological: Negative for adenopathy. Does not bruise/bleed easily.   Psychiatric/Behavioral: Negative for agitation, behavioral problems, confusion,  decreased concentration, dysphoric mood, hallucinations, self-injury, sleep disturbance and suicidal ideas. The patient is not nervous/anxious and is not hyperactive.      Objective:     Vital Signs (Most Recent):  Temp: 97.2 °F (36.2 °C) (05/04/18 0322)  Pulse: (!) 53 (05/04/18 0322)  Resp: 19 (05/04/18 0322)  BP: (!) 164/73 (05/04/18 0322)  SpO2: 96 % (05/04/18 0322) Vital Signs (24h Range):  Temp:  [97.2 °F (36.2 °C)-98.5 °F (36.9 °C)] 97.2 °F (36.2 °C)  Pulse:  [53-63] 53  Resp:  [16-19] 19  SpO2:  [96 %-99 %] 96 %  BP: (134-166)/(64-76) 164/73     Weight: 80.6 kg (177 lb 9.6 oz)  Body mass index is 24.09 kg/m².    Physical Exam   Constitutional: She is oriented to person, place, and time. She appears well-developed and well-nourished. No distress.   HENT:   Head: Normocephalic and atraumatic.   Right Ear: External ear normal.   Left Ear: External ear normal.   Nose: Nose normal.   Mouth/Throat: Oropharynx is clear and moist. No oropharyngeal exudate.   Eyes: Conjunctivae and EOM are normal. Pupils are equal, round, and reactive to light. No scleral icterus.   Neck: Neck supple. No JVD present. No tracheal deviation present. No thyromegaly present.   Cardiovascular: Normal rate, regular rhythm and normal heart sounds.  Exam reveals no gallop.    No murmur heard.  Pulmonary/Chest: Effort normal and breath sounds normal. No respiratory distress. She has no wheezes. She has no rales. She exhibits no tenderness.   Abdominal: Soft. Bowel sounds are normal. She exhibits no distension and no mass. There is no tenderness. There is no rebound and no guarding.   Genitourinary: No vaginal discharge found.   Musculoskeletal: She exhibits no edema or tenderness.   Lymphadenopathy:     She has no cervical adenopathy.   Neurological: She is alert and oriented to person, place, and time. She has normal reflexes. She displays normal reflexes. No cranial nerve deficit. She exhibits normal muscle tone. Coordination normal.   Skin:  Skin is warm and dry. No rash noted. She is not diaphoretic. No erythema. No pallor.   Psychiatric: She has a normal mood and affect. Her behavior is normal. Judgment and thought content normal.         CRANIAL NERVES     CN III, IV, VI   Pupils are equal, round, and reactive to light.  Extraocular motions are normal.       Significant Labs:   Recent Results (from the past 48 hour(s))   POCT glucose    Collection Time: 05/02/18 11:44 AM   Result Value Ref Range    POC Glucose 142 (A) 74 - 106 mg/dL   POCT glucose    Collection Time: 05/02/18  5:34 PM   Result Value Ref Range    POC Glucose 138 (A) 74 - 106 mg/dL   POCT glucose    Collection Time: 05/02/18  8:38 PM   Result Value Ref Range    POC Glucose 188 (A) 74 - 106 mg/dL   POCT glucose    Collection Time: 05/03/18  4:39 AM   Result Value Ref Range    POC Glucose 146 (A) 74 - 106 mg/dL   Comprehensive Metabolic Panel (CMP)    Collection Time: 05/03/18  4:48 AM   Result Value Ref Range    Sodium 145 136 - 145 mmol/L    Potassium 3.9 3.5 - 5.1 mmol/L    Chloride 105 95 - 110 mmol/L    CO2 30 (H) 23 - 29 mmol/L    Glucose 128 (H) 74 - 106 mg/dL    BUN, Bld 29 (H) 7 - 17 mg/dL    Creatinine 1.00 0.70 - 1.20 mg/dL    Calcium 9.4 8.4 - 10.2 mg/dL    Total Protein 5.8 (L) 6.3 - 8.2 g/dL    Albumin 3.5 3.5 - 5.2 g/dL    Total Bilirubin 1.0 0.2 - 1.3 mg/dL    Alkaline Phosphatase 86 38 - 145 U/L    AST 19 14 - 36 U/L    ALT 32 10 - 44 U/L    eGFR if African American >60 >60 mL/min/1.73 m^2    eGFR if non African American 55 (A) >60 mL/min/1.73 m^2   Magnesium    Collection Time: 05/03/18  4:48 AM   Result Value Ref Range    Magnesium 1.9 1.6 - 2.6 mg/dL   Phosphorus    Collection Time: 05/03/18  4:48 AM   Result Value Ref Range    Phosphorus 3.00 2.40 - 4.50 mg/dL   CBC with Automated Differential    Collection Time: 05/03/18  4:48 AM   Result Value Ref Range    WBC 11.00 3.90 - 12.70 K/uL    RBC 3.92 (L) 4.00 - 5.40 M/uL    Hemoglobin 11.9 (L) 12.0 - 16.0 g/dL     Hematocrit 35.0 (L) 37.0 - 48.5 %    MCV 90 82 - 98 fL    MCH 30.3 27.0 - 31.0 pg    MCHC 33.8 32.0 - 36.0 g/dL    RDW 14.4 11.5 - 14.5 %    Platelets 279 150 - 350 K/uL    MPV 10.1 9.2 - 12.9 fL    Gran # (ANC) 4.7 1.8 - 7.7 K/uL    Lymph # 4.7 1.0 - 4.8 K/uL    Mono # 1.2 (H) 0.3 - 1.0 K/uL    Eos # 0.2 0.0 - 0.5 K/uL    Baso # 0.10 0.00 - 0.20 K/uL    nRBC 0 0 /100 WBC    Gran% 43.0 38.0 - 73.0 %    Lymph% 43.1 18.0 - 48.0 %    Mono% 11.2 4.0 - 15.0 %    Eosinophil% 1.4 0.0 - 8.0 %    Basophil% 1.3 0.0 - 1.9 %    Differential Method Automated    POCT glucose    Collection Time: 05/03/18 11:34 AM   Result Value Ref Range    POC Glucose 180 (A) 74 - 106 mg/dL   POCT glucose    Collection Time: 05/03/18  5:02 PM   Result Value Ref Range    POC Glucose 123 (A) 74 - 106 mg/dL   POCT glucose    Collection Time: 05/03/18  8:35 PM   Result Value Ref Range    POC Glucose 183 (A) 74 - 106 mg/dL         Significant Imaging: I have reviewed and interpreted all pertinent imaging results/findings within the past 24 hours.     [facility] ( 04/30 ) :     1. Left ventricular end diastolic pressure was 15 mmHg.  2. Left ventricular function: normal  3. LM: 95%.  4. LAD:  Proximal %. LAD fills bidirectionally.  5. CFx:  moderate.  6. RCA:  Proximal mild. Mid vessel 75%. Distal 30%.        Impression:  1. Severe multivessel CAD with critical LM disease.  2. Normal LVSF      Assessment/Plan:     Active Diagnoses:    Diagnosis Date Noted POA    PRINCIPAL PROBLEM:  CAD (coronary artery disease) [I25.10] 04/30/2018 Yes    HTN (hypertension) [I10] 01/02/2015 Yes     Chronic    Chronic low back pain [M54.5, G89.29] 12/12/2014 Yes     Chronic    Diabetes mellitus, type 2 [E11.9]  Yes     Chronic    Hyperlipidemia [E78.5]  Yes      Problems Resolved During this Admission:    Diagnosis Date Noted Date Resolved POA     # Multivessel CAD involving left main and LAD   - had LHC on 04/30 at [facility]   - denies chest pain or dyspnea   - patient  not a candidate for CABG given anatomy of her coronary vessels per Dr. Germain from CTS   - transferred to [facility] for high risk PCI   - continue GDMT with aspirin, beta blocker, statin   - 2D Echo with CFD   - NPO pending interventional cardiology evaluation     # paroxysmal afib   - continue heparin with high [id number] score of 6  - no evidence of bleeding   - rate controlled with metoprolol   - telemetry monitoring     # HTN   - SBP ~160   - continue metoprolol, lisinopril     #DM2  - takes metformin at home   - hold oral agents   - low dose SSI while NPO     # chronic low back pain   - lumbar DJD   - had back surgery last year w/o improvement   - on gabapentin and norco 10 mg prn    - will continue   - fall precaution     VTE Risk Mitigation         Ordered     heparin 25,000 units in dextrose 5% 250 mL (100 units/mL) infusion  Continuous      05/04/18 0457     heparin 25,000 units in dextrose 5% 250 mL (100 units/mL) bolus from bag; ADDITIONAL PRN BOLUS  As needed (PRN)      05/04/18 0457     heparin 25,000 units in dextrose 5% 250 mL (100 units/mL) bolus from bag; ADDITIONAL PRN BOLUS  As needed (PRN)      05/04/18 0457     IP VTE HIGH RISK PATIENT  Once      05/04/18 0453     Place sequential compression device  Until discontinued      05/04/18 0453            Reuben Alaniz DO  Department of Hospital Medicine   [facility]

## 2018-05-05 LAB
BASOPHILS # BLD AUTO: 0.11 K/UL
BASOPHILS NFR BLD: 0.9 %
DIFFERENTIAL METHOD: ABNORMAL
EOSINOPHIL # BLD AUTO: 0.4 K/UL
EOSINOPHIL NFR BLD: 3.1 %
ERYTHROCYTE [DISTWIDTH] IN BLOOD BY AUTOMATED COUNT: 14 %
FACT X PPP CHRO-ACNC: 0.78 IU/ML
FACT X PPP CHRO-ACNC: 0.85 IU/ML
HCT VFR BLD AUTO: 36.7 %
HGB BLD-MCNC: 11.6 G/DL
IMM GRANULOCYTES # BLD AUTO: 0.05 K/UL
IMM GRANULOCYTES NFR BLD AUTO: 0.4 %
LYMPHOCYTES # BLD AUTO: 4.6 K/UL
LYMPHOCYTES NFR BLD: 36.4 %
MCH RBC QN AUTO: 29.4 PG
MCHC RBC AUTO-ENTMCNC: 31.6 G/DL
MCV RBC AUTO: 93 FL
MONOCYTES # BLD AUTO: 1.2 K/UL
MONOCYTES NFR BLD: 9.5 %
NEUTROPHILS # BLD AUTO: 6.3 K/UL
NEUTROPHILS NFR BLD: 49.7 %
NRBC BLD-RTO: 0 /100 WBC
PLATELET # BLD AUTO: 279 K/UL
PMV BLD AUTO: 11 FL
POCT GLUCOSE: 131 MG/DL (ref 70–110)
POCT GLUCOSE: 210 MG/DL (ref 70–110)
RBC # BLD AUTO: 3.94 M/UL
WBC # BLD AUTO: 12.58 K/UL

## 2018-05-05 PROCEDURE — 25000003 PHARM REV CODE 250: Performed by: HOSPITALIST

## 2018-05-05 PROCEDURE — 25000003 PHARM REV CODE 250: Performed by: STUDENT IN AN ORGANIZED HEALTH CARE EDUCATION/TRAINING PROGRAM

## 2018-05-05 PROCEDURE — 63600175 PHARM REV CODE 636 W HCPCS: Performed by: HOSPITALIST

## 2018-05-05 PROCEDURE — 20600001 HC STEP DOWN PRIVATE ROOM

## 2018-05-05 PROCEDURE — 36415 COLL VENOUS BLD VENIPUNCTURE: CPT

## 2018-05-05 PROCEDURE — 85520 HEPARIN ASSAY: CPT | Mod: 91

## 2018-05-05 PROCEDURE — 25000003 PHARM REV CODE 250: Performed by: NURSE PRACTITIONER

## 2018-05-05 PROCEDURE — 85520 HEPARIN ASSAY: CPT

## 2018-05-05 PROCEDURE — 85025 COMPLETE CBC W/AUTO DIFF WBC: CPT

## 2018-05-05 PROCEDURE — 99232 SBSQ HOSP IP/OBS MODERATE 35: CPT | Mod: ,,, | Performed by: NURSE PRACTITIONER

## 2018-05-05 RX ORDER — METOPROLOL TARTRATE 25 MG/1
25 TABLET, FILM COATED ORAL 2 TIMES DAILY
Status: DISCONTINUED | OUTPATIENT
Start: 2018-05-05 | End: 2018-05-07

## 2018-05-05 RX ADMIN — CITALOPRAM HYDROBROMIDE 10 MG: 10 TABLET ORAL at 08:05

## 2018-05-05 RX ADMIN — CETIRIZINE HYDROCHLORIDE 10 MG: 5 TABLET, FILM COATED ORAL at 08:05

## 2018-05-05 RX ADMIN — PANTOPRAZOLE SODIUM 40 MG: 40 TABLET, DELAYED RELEASE ORAL at 08:05

## 2018-05-05 RX ADMIN — TIZANIDINE 4 MG: 4 TABLET ORAL at 08:05

## 2018-05-05 RX ADMIN — OXYBUTYNIN CHLORIDE 5 MG: 5 TABLET, FILM COATED, EXTENDED RELEASE ORAL at 05:05

## 2018-05-05 RX ADMIN — GABAPENTIN 800 MG: 400 CAPSULE ORAL at 01:05

## 2018-05-05 RX ADMIN — METOPROLOL TARTRATE 25 MG: 25 TABLET ORAL at 08:05

## 2018-05-05 RX ADMIN — ATORVASTATIN CALCIUM 40 MG: 20 TABLET, FILM COATED ORAL at 08:05

## 2018-05-05 RX ADMIN — ASPIRIN 81 MG: 81 TABLET, COATED ORAL at 08:05

## 2018-05-05 RX ADMIN — TIZANIDINE 4 MG: 4 TABLET ORAL at 10:05

## 2018-05-05 RX ADMIN — CARVEDILOL 12.5 MG: 12.5 TABLET, FILM COATED ORAL at 08:05

## 2018-05-05 RX ADMIN — GABAPENTIN 800 MG: 400 CAPSULE ORAL at 08:05

## 2018-05-05 RX ADMIN — LISINOPRIL 20 MG: 20 TABLET ORAL at 08:05

## 2018-05-05 RX ADMIN — GABAPENTIN 800 MG: 400 CAPSULE ORAL at 05:05

## 2018-05-05 RX ADMIN — THERA TABS 1 TABLET: TAB at 08:05

## 2018-05-05 RX ADMIN — VITAMIN D, TAB 1000IU (100/BT) 1000 UNITS: 25 TAB at 08:05

## 2018-05-05 RX ADMIN — SODIUM CHLORIDE 500 ML: 0.9 INJECTION, SOLUTION INTRAVENOUS at 01:05

## 2018-05-05 RX ADMIN — INSULIN ASPART 2 UNITS: 100 INJECTION, SOLUTION INTRAVENOUS; SUBCUTANEOUS at 01:05

## 2018-05-05 RX ADMIN — CLOPIDOGREL 75 MG: 75 TABLET, FILM COATED ORAL at 08:05

## 2018-05-05 RX ADMIN — SODIUM CHLORIDE 500 ML: 0.9 INJECTION, SOLUTION INTRAVENOUS at 12:05

## 2018-05-05 NOTE — PROGRESS NOTES
Tech reported hypotension, manual BP low. Pt resting comfortably in bed. Pt reports drowsiness but denies any other complaints. Other VSS. Charge notified. KIP Cheung notified; instructed to administer 500cc bolus over 1hr. Orders implemented as instructed. Pt instructed to call for assistance, bed alarm on fall risk precautions in place. Pt and family verbalized understanding. Will continue to monitor.     05/05/18 1221 05/05/18 1225 05/05/18 1230   Vital Signs   Pulse (!) 57 (!) 56 (!) 56   Heart Rate Source Monitor Monitor Monitor   BP (!) 76/38 (!) 76/43 (!) 76/45   MAP (mmHg) --  55 56   BP Location Right arm Right arm Right arm   BP Method Manual Automatic Automatic   Patient Position Lying Lying Lying

## 2018-05-05 NOTE — HPI
Ms. Sravani Muñoz is a 78 year old female with h/o HTN, HLD, DM2, paroxysmal afib, AFL s/p DCCV on pradaxa in the past, CAD, lumbar DJD with chronic low back pain, breast cancer s/p left mastectomy who is a Christian. She [place] [facility] [place] clinic with asymptomatic hypotension and bradycardia. Patients in clinic her BP was 60/30 but denied any symptoms.  EKG on admission showed sinus bradycardia with HR 42 and first degree heart block. Metoprolol was held due to bradycardia. No chest pain and SOB occurred throughout her hospital stay. Troponin was 0.052 and then trended down. NT-proBNP was elevated at 02263. Patient developed afib w RVR on 04/27 and started on metoprolol, pradaxa for CVA PPx and then was switched to heparin gtt as she would require LHC.      Patient had elective diagnostic LHC on 04/30 which revealed multivessel disease including left main artery, LAD, CFx and RCA. Normal LVSF noted on ventriculogram. Case was discussed with CTS, Dr. Germain who deemed the patient as not a CABG candidate due to being Christian. Dr. Germain reviewed the LHC with Dr. Tellez for evaluation for CABG vs PCI.    Since arrival at [facility], she feels well and denies any complaints. Interventional cardiology consulted for LHC with PCI. She was not loaded with a P2Y12 inhibitor. In addition, she has an iodine allergy and was not prepped to receive contrast.

## 2018-05-05 NOTE — PROGRESS NOTES
[facility] Hospital Medicine  Progress Note    Patient Name: Elías Muñoz  MRN: [id number]  Patient Class: IP- Inpatient   Admission Date: 5/4/2018  Length of Stay: 1 days  Attending Physician: Al Montes MD  Primary Care Provider: Calin Casas MD    [facility] Medicine Team: [facility] HOSP MED J Krystal Mercedes NP    Subjective:     Principal Problem:CAD (coronary artery disease)    HPI:  78 year old female with h/o HTN, HLD, DM2, paroxysmal afib, CAD, lumbar DJD with chronic low back pain, fall risk due to unsteady gait, breast cancer s/p left mastectomy who is a Sabianist. She was admitted to [facility] on 04/25 from clinic with asymptomatic hypotension and bradycardia. Patients in clinic her BP was 60/30 but she felt fine.  EKG on admission showed sinus bradycardia with HR 42 and first degree ABV. Metoprolol was held due to bradycardia. No chest pain and SOB. Troponin negative. Patient developed afib w RVR on 04/27 and started on BB, NOAC pradaxa. Patient was evaluated by cardiology while in hospital for bradycardia and then afib w RVR.      Patient had elective diagnostic LHC on 04/30 which revealed multivessel disease including left main artery, LAD, CFx and RCA. Normal LVSF     Case discussed with Dr. Germain from CTS and deemed patient is not a CABG candidate due to anatomy. Dr. Germain reviewed the [facility] with Dr. Jose Tellez from interventional cardiology and suggested transfer to [facility] for high risk PCI.      Patient currently on heparin infusion since [facility] w/o evidence of bleeding. She is a Sabianist and refusing any blood product transfusion. She has iodine allergy and received prednisone prior to recent [facility] w/o any adverse reaction.     Hospital Course:  Admitted to hospital medicine as transfer from Dr Cooley [facility] for high risk PCI.  C LM 95%, LAD 9-100%, Lcx, RCA 75%.  H/o episode of Afib with RVR on 4/27 placed on pradaxa  which was transitioned to heparin gtt for procedure purposes.  She will need to be transitioned back to pradaxa for discharge.  Currently SR/SB.  Patient has dye allergy and was not prepped so she will have her procedure on 5/7.   She was noted to be hypertensive so metoprolol was changed to coreg, however this proved to be slight too much and she was hypotensive to 70's requiring fluid bolus.  BB changed back to metoprolol 25mg bid.      Patient is a Shinto, she has been turned down by Dr. Germain/ CTS.  Dr. Tellez reviewed films and agreed to high risk PCI.     Dispo: home to primary cardiologist post procedure.     Interval History: Looking forward to procedure to fix her heart.  Only c/o is back pain.  Patient had episode of hypotension post coreg administration.  Bolused with IVF 500ml x 2.     Review of Systems   Constitutional: Negative for activity change, appetite change and fatigue.   HENT: Negative for congestion, sore throat and trouble swallowing.    Respiratory: Negative for cough and shortness of breath.    Cardiovascular: Negative for chest pain, palpitations and leg swelling.   Gastrointestinal: Negative for abdominal pain, constipation, diarrhea, nausea and vomiting.   Genitourinary: Negative for decreased urine volume, difficulty urinating and hematuria.   Musculoskeletal: Positive for back pain. Negative for myalgias.   Neurological: Negative for dizziness, weakness, light-headedness, numbness and headaches.   Hematological: Does not bruise/bleed easily.   Psychiatric/Behavioral: Negative for agitation, decreased concentration and sleep disturbance. The patient is not nervous/anxious.      Objective:     Vital Signs (Most Recent):  Temp: 98.3 °F (36.8 °C) (05/04/18 0805)  Pulse: (!) 52 (05/04/18 0805)  Resp: 18 (05/04/18 0805)  BP: (!) 160/71 (05/04/18 0805)  SpO2: 98 % (05/04/18 0805) Vital Signs (24h Range):  Temp:  [97.2 °F (36.2 °C)-98.3 °F (36.8 °C)] 98.3 °F (36.8 °C)  Pulse:   [52-63] 52  Resp:  [18-19] 18  SpO2:  [96 %-98 %] 98 %  BP: (134-164)/(64-76) 160/71     Weight: 80.6 kg (177 lb 9.6 oz)  Body mass index is 27 kg/m².  No intake or output data in the 24 hours ending 05/04/18 0900   Physical Exam   Constitutional: She is oriented to person, place, and time. She appears well-developed and well-nourished. No distress.   HENT:   Head: Normocephalic and atraumatic.   Right Ear: External ear normal.   Left Ear: External ear normal.   Nose: Nose normal.   Eyes: Conjunctivae and EOM are normal.   Neck: Normal range of motion. Neck supple. No JVD present.   Cardiovascular: Normal rate, regular rhythm, normal heart sounds and intact distal pulses.    No murmur heard.  Pulmonary/Chest: Effort normal and breath sounds normal. No respiratory distress. She has no wheezes. She has no rales.   Abdominal: Soft. Bowel sounds are normal. She exhibits no distension and no mass. There is no tenderness. There is no guarding.   Musculoskeletal: Normal range of motion. She exhibits no edema.   Neurological: She is alert and oriented to person, place, and time. No cranial nerve deficit or sensory deficit. Coordination normal.   Skin: Skin is warm and dry. Capillary refill takes 2 to 3 seconds. No rash noted. She is not diaphoretic. No erythema.   Psychiatric: She has a normal mood and affect. Her behavior is normal. Judgment and thought content normal.   Nursing note and vitals reviewed.      Significant Labs:   CBC:   Recent Labs  Lab 05/03/18 0448 05/04/18  0528   WBC 11.00 9.62   HGB 11.9* 11.2*   HCT 35.0* 35.9*    291     CMP:   Recent Labs  Lab 05/03/18  0448 05/04/18  0529    140   K 3.9 4.0    105   CO2 30* 28   * 119*   BUN 29* 28*   CREATININE 1.00 1.0   CALCIUM 9.4 9.8   PROT 5.8* 5.7*   ALBUMIN 3.5 3.1*   BILITOT 1.0 1.1*   ALKPHOS 86 85   AST 19 13   ALT 32 14   ANIONGAP  --  7*   EGFRNONAA 55* 54.9*       Significant Imaging: I have reviewed all pertinent imaging  results/findings within the past 24 hours.     2d cfd echo: 5/4/18   1 - Upper limit of normal left ventricular enlargement.     2 - Normal left ventricular systolic function (EF 65-70%).     3 - Severe left atrial enlargement.     4 - No wall motion abnormalities.     5 - Indeterminate LV diastolic function.     6 - Normal right ventricular systolic function .     7 - Trivial to mild mitral regurgitation.     8 - Trivial to mild tricuspid regurgitation.     9 - The estimated PA systolic pressure is 28 mmHg.     Assessment/Plan:      * CAD (coronary artery disease)    - transferred to Dr. Tellez for high risk PCI  - heparin gtt two fold to cover h/o Afib with RVR and prep for LHC on Monday  - on GDMT, b/p was elevated attempted transition of metoprolol to coreg but too potent coupled with ACEI  Hypotensive so gave 500ml bolus x 2.    - patient asymptomatic  - needs DYE prep prior to LHC (ordered)  - tele monitor continuous           HTN (hypertension)    - hypertensive on admit metop changed to coreg but too potent coupled with ACEI, hypotensive gave fluids, resolving, changed coreg back to metop to 25mg bid  - may need to hold ACE or decrease ACE in am.           Paroxysmal A-fib    - episode OSH, started on pradaxa, unknown if she ever was sent home with this prior to transfer, consider switching to eliquis or xarelto  - currently SR/ SB  - on heparin gtt to bridge to PCI then she will need to resume NOAC post procedure.   - f/u with EP           Hyperlipidemia    Atorvastatin changed to 80mg  - lipid panel  Chol 113    HDL 40  LDL 47.4        Diabetes mellitus, type 2    - oral hypoglycemics held  - hgb a1 5.5  - SSI for now and diabetic/ card diet           Chronic low back pain    - tylenol and zanaflex for now             VTE Risk Mitigation         Ordered     heparin 25,000 units in dextrose 5% 250 mL (100 units/mL) infusion  Continuous      05/04/18 4180     heparin 25,000 units in dextrose 5% 250 mL  (100 units/mL) bolus from bag; ADDITIONAL PRN BOLUS  As needed (PRN)      05/04/18 0457     heparin 25,000 units in dextrose 5% 250 mL (100 units/mL) bolus from bag; ADDITIONAL PRN BOLUS  As needed (PRN)      05/04/18 0457     IP VTE HIGH RISK PATIENT  Once      05/04/18 0453     Place sequential compression device  Until discontinued      05/04/18 0453              Krystal Mercedes NP  Department of Hospital Medicine   [facility]  Spectra:  [phone]  Pager: [phone]

## 2018-05-05 NOTE — PLAN OF CARE
Problem: Patient Care Overview  Goal: Plan of Care Review  Outcome: Revised  Pt free of falls/trauma/injuries.  Denies c/o SOB or CP. Pt being tx with PO analgesics for back pain.  Generalized skin remains CDI; No edema noted.  Coreg switched back to Metoprolol dt hypotension; Two  500cc boluses administered. Plan to do [facility] Monday 5/7/18.  Pt tolerating plan of care.

## 2018-05-05 NOTE — ASSESSMENT & PLAN NOTE
- transferred to Dr. Tellez for high risk PCI  - heparin gtt two fold to cover h/o Afib with RVR and prep for LHC on Monday  - on GDMT, b/p was elevated attempted transition of metoprolol to coreg but too potent coupled with ACEI  Hypotensive so gave 500ml bolus x 2.    - patient asymptomatic  - needs DYE prep prior to LHC (ordered)  - tele monitor continuous

## 2018-05-05 NOTE — CONSULTS
[facility]  Interventional Cardiology  Consult Note    Patient Name: Elías Muñoz  MRN: [id number]  Admission Date: 5/4/2018  Hospital Length of Stay: 0 days  Code Status: Full Code   Attending Provider: Al Montes MD  Consulting Provider: Kaitlin Catherine MD  Primary Care Physician: Calin Casas MD  Principal Problem:CAD (coronary artery disease)    Patient information was obtained from patient and past medical records.     Inpatient consult to Interventional Cardiology  Consult performed by: KAITLIN CATHERINE  Consult ordered by: HAKAN JOHN  Reason for consult: Multivessel CAD        Subjective:     Chief Complaint:  Multivessel CAD     HPI:  Ms. Sravani Muñoz is a 78 year old female with h/o HTN, HLD, DM2, paroxysmal afib, AFL s/p DCCV on pradaxa in the past, CAD, lumbar DJD with chronic low back pain, breast cancer s/p left mastectomy who is a [employer]. She was [place] to [facility] on [place]25 from clinic with asymptomatic hypotension and bradycardia. Patients in clinic her BP was 60/30 but denied any symptoms.  EKG on admission showed sinus bradycardia with HR 42 and first degree heart block. Metoprolol was held due to bradycardia. No chest pain and SOB occurred throughout her hospital stay. Troponin was 0.052 and then trended down. NT-proBNP was elevated at 90161. Patient developed afib w RVR on 04/27 and started on metoprolol, pradaxa for CVA PPx and then was switched to heparin gtt as she would require LHC.      Patient had elective diagnostic LHC on 04/30 which revealed multivessel disease including left main artery, LAD, CFx and RCA. Normal LVSF noted on ventriculogram. Case was discussed with CTS, Dr. Germain who deemed the patient as not a CABG candidate due to being Buddhist. Dr. Germain reviewed the LHC with Dr. Tellez for evaluation for CABG vs PCI.    Since arrival at [facility], she feels well and denies any complaints. Interventional cardiology consulted  for [facility] with PCI. She was not loaded with a P2Y12 inhibitor. In addition, she has an iodine allergy and was not prepped to receive contrast.      Past Medical History:   Diagnosis Date    ADARSH (acute kidney injury)     Anticoagulant long-term use     Arthritis     Atrial fibrillation     Atrial flutter     s/p cardioversion with no recurrence    CAD (coronary artery disease) 4/30/2018    Carpal tunnel syndrome     Cataract     CHF (congestive heart failure)     Chronic low back pain     CTS (carpal tunnel syndrome)     Depression     Diabetes mellitus, type 2     Diastolic heart failure     Disorder of kidney and ureter     GERD (gastroesophageal reflux disease)     Hyperlipidemia     Hypertension     Hypotension     Malignant neoplasm of central portion of female breast 8/19/2014    Neuropathy     Stress incontinence     Stress incontinence     Syncope     Syncope        Past Surgical History:   Procedure Laterality Date    ADENOIDECTOMY      BACK SURGERY      BREAST BIOPSY Left     malignant    CATARACT EXTRACTION Left     CHOLECYSTECTOMY      EYE SURGERY Bilateral     cataract    INCONTINENCE SURGERY      MASTECTOMY, RADICAL Left     PORTACATH PLACEMENT Right     TONSILLECTOMY         Review of patient's allergies indicates:   Allergen Reactions    Iodine and iodide containing products Anaphylaxis    Latex, natural rubber Rash    Adhesive      Takes skin off     Morphine      Makes her sick    Penicillins Other (See Comments)    Betadine [povidone-iodine] Rash    Iodinated contrast- oral and iv dye Rash    Lidoderm [lidocaine] Rash     Adhesives on patch not lidocaine       PTA Medications   Medication Sig    aspirin (ECOTRIN) 81 MG EC tablet Take 1 tablet (81 mg total) by mouth once daily.    atorvastatin (LIPITOR) 10 MG tablet TAKE 1 TABLET BY MOUTH EVERY EVENING (Patient taking differently: TAKE 1 TABLET(10mg)BY MOUTH EVERY EVENING)    citalopram (CELEXA) 10 MG  tablet Take 1 tablet (10 mg total) by mouth once daily.    gabapentin (NEURONTIN) 800 MG tablet Take 1 tablet (800 mg total) by mouth 4 (four) times daily.    hydrocodone-acetaminophen 10-325mg (NORCO)  mg Tab Take 1 tablet by mouth every 6 (six) hours as needed for Pain (breakthrough pain only).    lisinopril (PRINIVIL,ZESTRIL) 20 MG tablet Take 1 tablet (20 mg total) by mouth once daily.    loratadine (CLARITIN) 10 mg tablet Take 1 tablet (10 mg total) by mouth once daily.    metoprolol succinate (TOPROL-XL) 50 MG 24 hr tablet Take 1 tablet (50 mg total) by mouth once daily.    multivitamin capsule Take 1 capsule by mouth once daily.    omeprazole (PRILOSEC) 40 MG capsule Take 1 capsule (40 mg total) by mouth once daily.    ondansetron (ZOFRAN-ODT) 4 MG TbDL Take 1 tablet (4 mg total) by mouth every 6 (six) hours as needed.    oxybutynin (DITROPAN-XL) 5 MG TR24 TAKE 1 TABLET BY MOUTH EVERY DAY AFTER DINNER (Patient taking differently: TAKE 1 TABLET(5mg) BY MOUTH EVERY DAY AFTER DINNER)    tiZANidine (ZANAFLEX) 4 MG tablet TAKE 1 TABLET BY MOUTH THREE TIMES DAILY AS NEEDED FOR MUSCLE SPASMS (Patient taking differently: TAKE 1 TABLET(4mg)BY MOUTH THREE TIMES DAILY AS NEEDED FOR MUSCLE SPASMS)    traMADol (ULTRAM) 50 mg tablet Take 1 tablet (50 mg total) by mouth every 6 (six) hours as needed.    vitamin D 1000 units Tab Take 1,000 Units by mouth once daily.     Family History     Problem Relation (Age of Onset)    Cancer Sister    Diabetes Father, Brother    Heart disease Mother, Father    Hyperlipidemia Brother    Hypertension Brother    Thyroid disease Sister        Social History Main Topics    Smoking status: Former Smoker     Packs/day: 1.00     Years: 43.00     Types: Cigarettes     Start date: 9/19/1957     Quit date: 9/19/2010    Smokeless tobacco: Never Used    Alcohol use No    Drug use: No    Sexual activity: No     ROS   Constitution: Negative for fever, chills, weight loss or  gain.   HENT: Negative for sore throat, rhinorrhea, or headache.  Eyes: Negative for blurred or double vision.   Cardiovascular: See above  Pulmonary: Negative for SOB   Gastrointestinal: Negative for abdominal pain, nausea, vomiting, or diarrhea.   : Negative for dysuria.   Neurological: Negative for focal weakness or sensory changes.    Objective:     Vital Signs (Most Recent):  Temp: 98 °F (36.7 °C) (05/04/18 1555)  Pulse: 64 (05/04/18 1900)  Resp: 18 (05/04/18 1555)  BP: 116/60 (05/04/18 1555)  SpO2: 97 % (05/04/18 1555) Vital Signs (24h Range):  Temp:  [97.2 °F (36.2 °C)-98.3 °F (36.8 °C)] 98 °F (36.7 °C)  Pulse:  [50-64] 64  Resp:  [18-19] 18  SpO2:  [96 %-99 %] 97 %  BP: (116-164)/(60-76) 116/60     Weight: 80.6 kg (177 lb 9.6 oz)  Body mass index is 27 kg/m².    SpO2: 97 %  O2 Device (Oxygen Therapy): room air      Intake/Output Summary (Last 24 hours) at 05/04/18 1913  Last data filed at 05/04/18 1800   Gross per 24 hour   Intake           585.62 ml   Output              575 ml   Net            10.62 ml       Lines/Drains/Airways     Central Venous Catheter Line                 Port A Cath Single Lumen right subclavian -- days                Physical Exam  Constitutional: NAD, conversant  HEENT: Sclera anicteric, PERRLA, EOMI  Neck: No JVD, no carotid bruits  CV: RRR, grade 2/6 systolic mumur in RUSB, normal S1/S2, No Pericardial rub  Pulm: CTAB with no wheezes, rales, or rhonchi  GI: Abdomen soft, NTND, +BS  Extremities: No LE edema, warm and well perfused, No cyanosis, No clubbing; BP equal in both arms  Skin: No ecchymosis, erythema, or ulcers  Psych: AOx3, appropriate affect  Neuro: CNII-XII intact, no focal deficits   LEFT RIGHT   RADIAL 2+ 2+   BRACHIAL     FEMORAL 2+ 2+   DP 2+ 2+   TP 2+ 2+   Dez's Test Normal Normal       Significant Labs:   CMP   Recent Labs  Lab 05/03/18  0448 05/04/18  0529    140   K 3.9 4.0    105   CO2 30* 28   * 119*   BUN 29* 28*   CREATININE 1.00 1.0    CALCIUM 9.4 9.8   PROT 5.8* 5.7*   ALBUMIN 3.5 3.1*   BILITOT 1.0 1.1*   ALKPHOS 86 85   AST 19 13   ALT 32 14   ANIONGAP  --  7*   ESTGFRAFRICA >60 >60.0   EGFRNONAA 55* 54.9*   , CBC   Recent Labs  Lab 05/03/18  0448 05/04/18  0528   WBC 11.00 9.62   HGB 11.9* 11.2*   HCT 35.0* 35.9*    291   , INR   Recent Labs  Lab 05/04/18  0528   INR 1.0   , Lipid Panel   Recent Labs  Lab 05/04/18  0529   CHOL 113*   HDL 40   LDLCALC 47.4*   TRIG 128   CHOLHDL 35.4   , Troponin   Recent Labs  Lab 05/04/18  0529   TROPONINI <0.006    and All pertinent lab results from the last 24 hours have been reviewed.    Significant Imaging:   TTE (5/4/18):  1 - Upper limit of normal left ventricular enlargement.     2 - Normal left ventricular systolic function (EF 65-70%).     3 - Severe left atrial enlargement.     4 - No wall motion abnormalities.     5 - Indeterminate LV diastolic function.     6 - Normal right ventricular systolic function .     7 - Trivial to mild mitral regurgitation.     8 - Trivial to mild tricuspid regurgitation.     9 - The estimated PA systolic pressure is 28 mmHg.     [facility] (4/30/18):  LMCA:       Lesion on LMCA: 95% stenosis .     LAD:       Lesion on Prox LAD: Mid subsection.100% stenosis .       Lesion on Mid LAD: 0% stenosis .       Comments:Widely patent    Fills bidirectionally .       Lesion on Dist LAD: 0% stenosis .       Comments:Widely patent     LCx:       Lesion on Prox CX: Distal subsection.50% stenosis .       Lesion on Mid CX: Proximal subsection.50% stenosis .       Lesion on Mid CX: Mid subsection.50% stenosis .       Lesion on Dist CX: 0% stenosis .       Comments:Widely patent     RCA:       Lesion on Prox RCA: Proximal subsection.40% stenosis .       Lesion on Prox RCA: Mid subsection.40% stenosis .       Lesion on Mid RCA: Proximal subsection.75% stenosis .       Lesion on Dist RCA: Distal subsection.30% stenosis .    Assessment and Plan:     * CAD (coronary artery disease)    -  significant multivessel disease including left main disease with history of DM  - CTS - defer CABG  - will proceed with [facility] with multivessel PCI on Monday  - load with plavix 600 mg PO x1, then start 75 mg once daily  - c/w ASA 81 mg QD  - c/w heparin gtt for CVA PPx for AF - hold 1 hour prior to procedure  - c/w lipitor 40 mg QD  - c/w toprol XL 50 mg QD (hold if HR is <50)  - c/w lisinopril    - Will proceed with [facility] with multivessel PCI on Monday -  Keep NPO after MN on Sunday night  - Anti-platelet Therapy: ASA 81 mg QD, Plavix 600 mg load x1 then 75 mg QD  - Access: R CFA 6F  - Catheters: Jl4/JR4  - Serum Creatinine/CrCl: 1.0  - Allergies: Iodine allergy (throat swelling; skin rash) - prep ordered for Sunday - prednisone 50 mg x 3 doses (Q6H), pepcid 20 mg X3 doses (Q12H), benadryl 50 mg x 3 doses (Q6H)  - Pre-Hydration: 3 cc/kg/hr IV, continuous, for 1 hour, Pre-Procedure  - Pre-Op Med: Diphenhydramine (Benadryl) 50 mg, Oral, Once, Pre-Procedure   - Pt is a JACQUES candidate and understands the importance of taking Ticagrelor 90 mg BID or Plavix 75 mg daily or Prasugrel 10 mg daily for at least one year, understands that in case of receiving a drug coated stent the failure to comply with dual anti-platelet therapy is likely to result in stent clotting, heart attack and death.   - The patient understands the risks and benefits and wishes to go ahead with the procedure.  - All patient's questions were answered.  - The risks, benefits & alternatives of the procedure were explained to the patient.   - The risks of coronary angiography include but are not limited to: Bleeding, infection, heart rhythm abnormalities, allergic reactions, kidney injury requiring dilaysis, limb loss, stroke and death.   - Should stenting be indicated, the patient has agreed to dual anti-platelet therapy for 1-consecutive year with a drug-eluting stent and a minimum of 1-month with the use of a bare metal stent.   - Additionally, pt is aware  that non-compliance is likely to result in stent clotting with heart attack, heart failure, and/or death.  - The risks of moderate sedation include hypotension, respiratory depression, arrhythmias, bronchospasm, & death.   - Informed consent was obtained & the patient is agreeable to proceed with the procedure.  - This patient was discussed with the attending interventional cardiologist who agrees with the above assessment & plan.              VTE Risk Mitigation         Ordered     heparin 25,000 units in dextrose 5% 250 mL (100 units/mL) infusion  Continuous      05/04/18 0457     heparin 25,000 units in dextrose 5% 250 mL (100 units/mL) bolus from bag; ADDITIONAL PRN BOLUS  As needed (PRN)      05/04/18 0457     heparin 25,000 units in dextrose 5% 250 mL (100 units/mL) bolus from bag; ADDITIONAL PRN BOLUS  As needed (PRN)      05/04/18 0457     IP VTE HIGH RISK PATIENT  Once      05/04/18 0453     Place sequential compression device  Until discontinued      05/04/18 0453          Thank you for your consult. I will follow-up with patient. Please contact us if you have any additional questions.    Kaitlin Catherine MD  Interventional Cardiology   [facility]

## 2018-05-05 NOTE — ASSESSMENT & PLAN NOTE
- significant multivessel disease including left main disease with history of DM  - CTS - defer CABG  - will proceed with [facility] with multivessel PCI on Monday  - load with plavix 600 mg PO x1, then start 75 mg once daily  - c/w ASA 81 mg QD  - c/w heparin gtt for CVA PPx for AF - hold 1 hour prior to procedure  - c/w lipitor 40 mg QD  - c/w toprol XL 50 mg QD (hold if HR is <50)  - c/w lisinopril    - Will proceed with [facility] with multivessel PCI on Monday -  Keep NPO after MN on Sunday night  - Anti-platelet Therapy: ASA 81 mg QD, Plavix 600 mg load x1 then 75 mg QD  - Access: R CFA 6F  - Catheters: Jl4/JR4  - Serum Creatinine/CrCl: 1.0  - Allergies: Iodine allergy (throat swelling; skin rash) - prep ordered for Sunday - prednisone 50 mg x 3 doses (Q6H), pepcid 20 mg X3 doses (Q12H), benadryl 50 mg x 3 doses (Q6H)  - Pre-Hydration: 3 cc/kg/hr IV, continuous, for 1 hour, Pre-Procedure  - Pre-Op Med: Diphenhydramine (Benadryl) 50 mg, Oral, Once, Pre-Procedure   - Pt is a JACQUES candidate and understands the importance of taking Ticagrelor 90 mg BID or Plavix 75 mg daily or Prasugrel 10 mg daily for at least one year, understands that in case of receiving a drug coated stent the failure to comply with dual anti-platelet therapy is likely to result in stent clotting, heart attack and death.   - The patient understands the risks and benefits and wishes to go ahead with the procedure.  - All patient's questions were answered.  - The risks, benefits & alternatives of the procedure were explained to the patient.   - The risks of coronary angiography include but are not limited to: Bleeding, infection, heart rhythm abnormalities, allergic reactions, kidney injury requiring dilaysis, limb loss, stroke and death.   - Should stenting be indicated, the patient has agreed to dual anti-platelet therapy for 1-consecutive year with a drug-eluting stent and a minimum of 1-month with the use of a bare metal stent.   - Additionally, pt is  aware that non-compliance is likely to result in stent clotting with heart attack, heart failure, and/or death.  - The risks of moderate sedation include hypotension, respiratory depression, arrhythmias, bronchospasm, & death.   - Informed consent was obtained & the patient is agreeable to proceed with the procedure.  - This patient was discussed with the attending interventional cardiologist who agrees with the above assessment & plan.

## 2018-05-05 NOTE — ASSESSMENT & PLAN NOTE
- episode OSH, started on pradaxa, unknown if she ever was sent home with this prior to transfer, consider switching to eliquis or xarelto  - currently SR/ SB  - on heparin gtt to bridge to PCI then she will need to resume NOAC post procedure.   - f/u with EP

## 2018-05-05 NOTE — PLAN OF CARE
AAO x 4. VSS, afebrile, SpO2>95% on RA. Telemetry monitoring SB-SR. BG monitoring AC/HS. heparin gtt at 14 units/kg/hr DW=80.6 kg or 11.3 mL/hr. AntiXa and SS of bleeding monitored. Fall precautions maintained and pt repositions self. POC reviewed with pt and family at bedside. See flowsheet for assessment findings. Will continue to monitor.

## 2018-05-05 NOTE — HPI
Ms. Muñoz is a 78 year old female with past medical history of HTN, HLD, DM2, paroxysmal afib, CAD, lumbar DJD with chronic low back pain, fall risk due to unsteady gait, and breast cancer s/p left mastectomy who is a [employer]. [place] [facility] [place] clinic with asymptomatic hypotension and bradycardia. Noted from clinic her BP was 60/30 but she felt fine. [place] time of [place] [facility] [place] sinus bradycardia with HR 42 and first degree ABV, metoprolol was held due to bradycardia, she denied chest pain or shortness of breath, and troponin negative. She then developed AFib with RVR on 04/27 and BB increased with initiation of NOAC pradaxa. Cardiology evaluated at OSH due to bradycardia and then afib w RVR. She then had elective diagnostic LHC on 04/30 which revealed multivessel disease including left main artery, LAD, CFx and RCA (LM 95%, LAD 9-100%, Lcx, RCA 75%); normal LVSF.      Case was discussed with Dr. Germain from CTS and deemed patient is not a CABG candidate due to anatomy. Dr. Germain then reviewed cath films with Dr. Jose Tellez from Interventional cardiology and suggested transfer to [facility] for high risk PCI. At time of transfer she had heparin infusion since LHC procedure w/o evidence of bleeding.  She has iodine allergy and received prednisone prior to recent LHC w/o any adverse reaction. She is a [employer] and refusing any blood product transfusion.    The patient was admitted to the Hospital Medicine Service for further evaluation and management.

## 2018-05-05 NOTE — ASSESSMENT & PLAN NOTE
- hypertensive on admit metop changed to coreg but too potent coupled with ACEI, hypotensive gave fluids, resolving, changed coreg back to metop to 25mg bid  - may need to hold ACE or decrease ACE in am.

## 2018-05-05 NOTE — SUBJECTIVE & OBJECTIVE
Past Medical History:   Diagnosis Date    ADARSH (acute kidney injury)     Anticoagulant long-term use     Arthritis     Atrial fibrillation     Atrial flutter     s/p cardioversion with no recurrence    CAD (coronary artery disease) 4/30/2018    Carpal tunnel syndrome     Cataract     CHF (congestive heart failure)     Chronic low back pain     CTS (carpal tunnel syndrome)     Depression     Diabetes mellitus, type 2     Diastolic heart failure     Disorder of kidney and ureter     GERD (gastroesophageal reflux disease)     Hyperlipidemia     Hypertension     Hypotension     Malignant neoplasm of central portion of female breast 8/19/2014    Neuropathy     Stress incontinence     Stress incontinence     Syncope     Syncope        Past Surgical History:   Procedure Laterality Date    ADENOIDECTOMY      BACK SURGERY      BREAST BIOPSY Left     malignant    CATARACT EXTRACTION Left     CHOLECYSTECTOMY      EYE SURGERY Bilateral     cataract    INCONTINENCE SURGERY      MASTECTOMY, RADICAL Left     PORTACATH PLACEMENT Right     TONSILLECTOMY         Review of patient's allergies indicates:   Allergen Reactions    Iodine and iodide containing products Anaphylaxis    Latex, natural rubber Rash    Adhesive      Takes skin off     Morphine      Makes her sick    Penicillins Other (See Comments)    Betadine [povidone-iodine] Rash    Iodinated contrast- oral and iv dye Rash    Lidoderm [lidocaine] Rash     Adhesives on patch not lidocaine       PTA Medications   Medication Sig    aspirin (ECOTRIN) 81 MG EC tablet Take 1 tablet (81 mg total) by mouth once daily.    atorvastatin (LIPITOR) 10 MG tablet TAKE 1 TABLET BY MOUTH EVERY EVENING (Patient taking differently: TAKE 1 TABLET(10mg)BY MOUTH EVERY EVENING)    citalopram (CELEXA) 10 MG tablet Take 1 tablet (10 mg total) by mouth once daily.    gabapentin (NEURONTIN) 800 MG tablet Take 1 tablet (800 mg total) by mouth 4 (four) times  daily.    hydrocodone-acetaminophen 10-325mg (NORCO)  mg Tab Take 1 tablet by mouth every 6 (six) hours as needed for Pain (breakthrough pain only).    lisinopril (PRINIVIL,ZESTRIL) 20 MG tablet Take 1 tablet (20 mg total) by mouth once daily.    loratadine (CLARITIN) 10 mg tablet Take 1 tablet (10 mg total) by mouth once daily.    metoprolol succinate (TOPROL-XL) 50 MG 24 hr tablet Take 1 tablet (50 mg total) by mouth once daily.    multivitamin capsule Take 1 capsule by mouth once daily.    omeprazole (PRILOSEC) 40 MG capsule Take 1 capsule (40 mg total) by mouth once daily.    ondansetron (ZOFRAN-ODT) 4 MG TbDL Take 1 tablet (4 mg total) by mouth every 6 (six) hours as needed.    oxybutynin (DITROPAN-XL) 5 MG TR24 TAKE 1 TABLET BY MOUTH EVERY DAY AFTER DINNER (Patient taking differently: TAKE 1 TABLET(5mg) BY MOUTH EVERY DAY AFTER DINNER)    tiZANidine (ZANAFLEX) 4 MG tablet TAKE 1 TABLET BY MOUTH THREE TIMES DAILY AS NEEDED FOR MUSCLE SPASMS (Patient taking differently: TAKE 1 TABLET(4mg)BY MOUTH THREE TIMES DAILY AS NEEDED FOR MUSCLE SPASMS)    traMADol (ULTRAM) 50 mg tablet Take 1 tablet (50 mg total) by mouth every 6 (six) hours as needed.    vitamin D 1000 units Tab Take 1,000 Units by mouth once daily.     Family History     Problem Relation (Age of Onset)    Cancer Sister    Diabetes Father, Brother    Heart disease Mother, Father    Hyperlipidemia Brother    Hypertension Brother    Thyroid disease Sister        Social History Main Topics    Smoking status: Former Smoker     Packs/day: 1.00     Years: 43.00     Types: Cigarettes     Start date: 9/19/1957     Quit date: 9/19/2010    Smokeless tobacco: Never Used    Alcohol use No    Drug use: No    Sexual activity: No     ROS   Constitution: Negative for fever, chills, weight loss or gain.   HENT: Negative for sore throat, rhinorrhea, or headache.  Eyes: Negative for blurred or double vision.   Cardiovascular: See above  Pulmonary:  Negative for SOB   Gastrointestinal: Negative for abdominal pain, nausea, vomiting, or diarrhea.   : Negative for dysuria.   Neurological: Negative for focal weakness or sensory changes.    Objective:     Vital Signs (Most Recent):  Temp: 98 °F (36.7 °C) (05/04/18 1555)  Pulse: 64 (05/04/18 1900)  Resp: 18 (05/04/18 1555)  BP: 116/60 (05/04/18 1555)  SpO2: 97 % (05/04/18 1555) Vital Signs (24h Range):  Temp:  [97.2 °F (36.2 °C)-98.3 °F (36.8 °C)] 98 °F (36.7 °C)  Pulse:  [50-64] 64  Resp:  [18-19] 18  SpO2:  [96 %-99 %] 97 %  BP: (116-164)/(60-76) 116/60     Weight: 80.6 kg (177 lb 9.6 oz)  Body mass index is 27 kg/m².    SpO2: 97 %  O2 Device (Oxygen Therapy): room air      Intake/Output Summary (Last 24 hours) at 05/04/18 1913  Last data filed at 05/04/18 1800   Gross per 24 hour   Intake           585.62 ml   Output              575 ml   Net            10.62 ml       Lines/Drains/Airways     Central Venous Catheter Line                 Port A Cath Single Lumen right subclavian -- days                Physical Exam  Constitutional: NAD, conversant  HEENT: Sclera anicteric, PERRLA, EOMI  Neck: No JVD, no carotid bruits  CV: RRR, grade 2/6 systolic mumur in RUSB, normal S1/S2, No Pericardial rub  Pulm: CTAB with no wheezes, rales, or rhonchi  GI: Abdomen soft, NTND, +BS  Extremities: No LE edema, warm and well perfused, No cyanosis, No clubbing  Skin: No ecchymosis, erythema, or ulcers  Psych: AOx3, appropriate affect  Neuro: CNII-XII intact, no focal deficits      Significant Labs:   CMP   Recent Labs  Lab 05/03/18  0448 05/04/18  0529    140   K 3.9 4.0    105   CO2 30* 28   * 119*   BUN 29* 28*   CREATININE 1.00 1.0   CALCIUM 9.4 9.8   PROT 5.8* 5.7*   ALBUMIN 3.5 3.1*   BILITOT 1.0 1.1*   ALKPHOS 86 85   AST 19 13   ALT 32 14   ANIONGAP  --  7*   ESTGFRAFRICA >60 >60.0   EGFRNONAA 55* 54.9*   , CBC   Recent Labs  Lab 05/03/18  0448 05/04/18  0528   WBC 11.00 9.62   HGB 11.9* 11.2*   HCT 35.0*  35.9*    291   , INR   Recent Labs  Lab 05/04/18  0528   INR 1.0   , Lipid Panel   Recent Labs  Lab 05/04/18  0529   CHOL 113*   HDL 40   LDLCALC 47.4*   TRIG 128   CHOLHDL 35.4   , Troponin   Recent Labs  Lab 05/04/18  0529   TROPONINI <0.006    and All pertinent lab results from the last 24 hours have been reviewed.    Significant Imaging:   TTE (5/4/18):  1 - Upper limit of normal left ventricular enlargement.     2 - Normal left ventricular systolic function (EF 65-70%).     3 - Severe left atrial enlargement.     4 - No wall motion abnormalities.     5 - Indeterminate LV diastolic function.     6 - Normal right ventricular systolic function .     7 - Trivial to mild mitral regurgitation.     8 - Trivial to mild tricuspid regurgitation.     9 - The estimated PA systolic pressure is 28 mmHg.     [facility] (4/30/18):  LMCA:       Lesion on LMCA: 95% stenosis .     LAD:       Lesion on Prox LAD: Mid subsection.100% stenosis .       Lesion on Mid LAD: 0% stenosis .       Comments:Widely patent    Fills bidirectionally .       Lesion on Dist LAD: 0% stenosis .       Comments:Widely patent     LCx:       Lesion on Prox CX: Distal subsection.50% stenosis .       Lesion on Mid CX: Proximal subsection.50% stenosis .       Lesion on Mid CX: Mid subsection.50% stenosis .       Lesion on Dist CX: 0% stenosis .       Comments:Widely patent     RCA:       Lesion on Prox RCA: Proximal subsection.40% stenosis .       Lesion on Prox RCA: Mid subsection.40% stenosis .       Lesion on Mid RCA: Proximal subsection.75% stenosis .       Lesion on Dist RCA: Distal subsection.30% stenosis .

## 2018-05-05 NOTE — PROGRESS NOTES
Second 500cc NS bolus completed.  Pt able to ambulate to and from bathroom with standby assist.  BP and other VSS.  Will continue to monitor.        05/05/18 1440   Vital Signs   Pulse 66   Heart Rate Source Monitor   BP (!) 113/56   MAP (mmHg) 80   BP Location Right arm   BP Method Automatic   Patient Position Lying

## 2018-05-05 NOTE — HOSPITAL COURSE
Ms. Guadalupe was transferred from [facility] on 5/4 for high risk PCI. While waiting for procedure she was noted to be hypertensive; metoprolol transitioned to coreg, however noted to have hypoTN with resultant rise in SCr despite 1 liter IV fluid resuscitation. BB/lisinopril held, 5/7 cath delayed. Episodes of chest pain relieved with SL NTG overnight 5/7, no acute EKG changes. Her SCr has returned to WNL, iodine allergy prep completed and underwent LHC 5/9 with findings of 95% LM stenosis, 60% Proximal LAD stenosis, and  Distal LAD; received JACQUES to LM. Follow-up in 1 month in clinic with Dr. Christie to consider LAD atherectomy and stenting    Disposition: Home with family, will need cardiac rehab referral sent, Interventional Cardiology follow up scheduled, outpt follow up with primary Cardiologist scheduled.

## 2018-05-06 LAB
ANION GAP SERPL CALC-SCNC: 4 MMOL/L
BUN SERPL-MCNC: 25 MG/DL
CALCIUM SERPL-MCNC: 9.5 MG/DL
CHLORIDE SERPL-SCNC: 108 MMOL/L
CO2 SERPL-SCNC: 29 MMOL/L
CREAT SERPL-MCNC: 0.9 MG/DL
EST. GFR  (AFRICAN AMERICAN): >60 ML/MIN/1.73 M^2
EST. GFR  (NON AFRICAN AMERICAN): >60 ML/MIN/1.73 M^2
FACT X PPP CHRO-ACNC: 1.56 IU/ML
FACT X PPP CHRO-ACNC: <0.1 IU/ML
FACT X PPP CHRO-ACNC: >1.86 IU/ML
FACT X PPP CHRO-ACNC: >1.86 IU/ML
GLUCOSE SERPL-MCNC: 130 MG/DL
POCT GLUCOSE: 127 MG/DL (ref 70–110)
POCT GLUCOSE: 148 MG/DL (ref 70–110)
POCT GLUCOSE: 177 MG/DL (ref 70–110)
POCT GLUCOSE: 202 MG/DL (ref 70–110)
POTASSIUM SERPL-SCNC: 4.5 MMOL/L
SODIUM SERPL-SCNC: 141 MMOL/L

## 2018-05-06 PROCEDURE — 63600175 PHARM REV CODE 636 W HCPCS: Performed by: STUDENT IN AN ORGANIZED HEALTH CARE EDUCATION/TRAINING PROGRAM

## 2018-05-06 PROCEDURE — 80048 BASIC METABOLIC PNL TOTAL CA: CPT

## 2018-05-06 PROCEDURE — 85520 HEPARIN ASSAY: CPT | Mod: 91

## 2018-05-06 PROCEDURE — 85520 HEPARIN ASSAY: CPT

## 2018-05-06 PROCEDURE — 20600001 HC STEP DOWN PRIVATE ROOM

## 2018-05-06 PROCEDURE — 25000003 PHARM REV CODE 250: Performed by: STUDENT IN AN ORGANIZED HEALTH CARE EDUCATION/TRAINING PROGRAM

## 2018-05-06 PROCEDURE — 25000003 PHARM REV CODE 250: Performed by: NURSE PRACTITIONER

## 2018-05-06 PROCEDURE — 36415 COLL VENOUS BLD VENIPUNCTURE: CPT

## 2018-05-06 PROCEDURE — 99231 SBSQ HOSP IP/OBS SF/LOW 25: CPT | Mod: ,,, | Performed by: NURSE PRACTITIONER

## 2018-05-06 PROCEDURE — 63600175 PHARM REV CODE 636 W HCPCS: Performed by: HOSPITALIST

## 2018-05-06 PROCEDURE — 25000003 PHARM REV CODE 250: Performed by: HOSPITALIST

## 2018-05-06 RX ADMIN — PREDNISONE 50 MG: 20 TABLET ORAL at 11:05

## 2018-05-06 RX ADMIN — HEPARIN SODIUM 10 UNITS/KG/HR: 10000 INJECTION, SOLUTION INTRAVENOUS at 02:05

## 2018-05-06 RX ADMIN — LISINOPRIL 20 MG: 20 TABLET ORAL at 08:05

## 2018-05-06 RX ADMIN — INSULIN ASPART 1 UNITS: 100 INJECTION, SOLUTION INTRAVENOUS; SUBCUTANEOUS at 09:05

## 2018-05-06 RX ADMIN — DIPHENHYDRAMINE HYDROCHLORIDE 50 MG: 50 CAPSULE ORAL at 05:05

## 2018-05-06 RX ADMIN — CETIRIZINE HYDROCHLORIDE 10 MG: 5 TABLET, FILM COATED ORAL at 08:05

## 2018-05-06 RX ADMIN — GABAPENTIN 800 MG: 400 CAPSULE ORAL at 08:05

## 2018-05-06 RX ADMIN — CLOPIDOGREL 75 MG: 75 TABLET, FILM COATED ORAL at 08:05

## 2018-05-06 RX ADMIN — FAMOTIDINE 20 MG: 20 TABLET, FILM COATED ORAL at 09:05

## 2018-05-06 RX ADMIN — ACETAMINOPHEN 650 MG: 325 TABLET ORAL at 01:05

## 2018-05-06 RX ADMIN — HEPARIN SODIUM 11 UNITS/KG/HR: 10000 INJECTION, SOLUTION INTRAVENOUS at 09:05

## 2018-05-06 RX ADMIN — ASPIRIN 81 MG: 81 TABLET, COATED ORAL at 08:05

## 2018-05-06 RX ADMIN — PANTOPRAZOLE SODIUM 40 MG: 40 TABLET, DELAYED RELEASE ORAL at 08:05

## 2018-05-06 RX ADMIN — OXYBUTYNIN CHLORIDE 5 MG: 5 TABLET, FILM COATED, EXTENDED RELEASE ORAL at 05:05

## 2018-05-06 RX ADMIN — GABAPENTIN 800 MG: 400 CAPSULE ORAL at 09:05

## 2018-05-06 RX ADMIN — GABAPENTIN 800 MG: 400 CAPSULE ORAL at 01:05

## 2018-05-06 RX ADMIN — METOPROLOL TARTRATE 25 MG: 25 TABLET ORAL at 09:05

## 2018-05-06 RX ADMIN — VITAMIN D, TAB 1000IU (100/BT) 1000 UNITS: 25 TAB at 08:05

## 2018-05-06 RX ADMIN — TIZANIDINE 4 MG: 4 TABLET ORAL at 04:05

## 2018-05-06 RX ADMIN — DIPHENHYDRAMINE HYDROCHLORIDE 50 MG: 50 CAPSULE ORAL at 11:05

## 2018-05-06 RX ADMIN — ATORVASTATIN CALCIUM 40 MG: 20 TABLET, FILM COATED ORAL at 09:05

## 2018-05-06 RX ADMIN — GABAPENTIN 800 MG: 400 CAPSULE ORAL at 04:05

## 2018-05-06 RX ADMIN — FAMOTIDINE 20 MG: 20 TABLET, FILM COATED ORAL at 08:05

## 2018-05-06 RX ADMIN — CITALOPRAM HYDROBROMIDE 10 MG: 10 TABLET ORAL at 08:05

## 2018-05-06 RX ADMIN — THERA TABS 1 TABLET: TAB at 08:05

## 2018-05-06 RX ADMIN — TIZANIDINE 4 MG: 4 TABLET ORAL at 01:05

## 2018-05-06 RX ADMIN — PREDNISONE 50 MG: 20 TABLET ORAL at 05:05

## 2018-05-06 NOTE — PROGRESS NOTES
[facility] Hospital Medicine  Progress Note    Patient Name: Elías Muñoz  MRN: [id number]  Patient Class: IP- Inpatient   Admission Date: 5/4/2018  Length of Stay: 2 days  Attending Physician: Al Montes MD  Primary Care Provider: Calin Casas MD    [facility] Medicine Team: [facility] J Karen Mejia NP    Subjective:     Principal Problem:Coronary artery disease involving native coronary artery of native heart without angina pectoris    HPI:  78 year old female with h/o HTN, HLD, DM2, paroxysmal afib, CAD, lumbar DJD with chronic low back pain, fall risk due to unsteady gait, breast cancer s/p left mastectomy who is a Sh[employer]. [place] [facility] [place] clinic with asymptomatic hypotension and bradycardia. Patients in clinic her BP was 60/30 but she felt fine.  EKG on admission showed sinus bradycardia with HR 42 and first degree ABV. Metoprolol was held due to bradycardia. No chest pain and SOB. Troponin negative. Patient developed afib w RVR on 04/27 and started on BB, NOAC pradaxa. Patient was evaluated by cardiology while in hospital for bradycardia and then afib w RVR.      Patient had elective diagnostic LHC on 04/30 which revealed multivessel disease including left main artery, LAD, CFx and RCA. Normal LVSF     Case discussed with Dr. Germain from [facility] and deemed patient is not a CABG candidate due to anatomy. Dr. Germain reviewed the [facility] with Dr. Jose Tellez from interventional cardiology and suggested transfer to [facility] for high risk PCI.      Patient currently on heparin infusion since [facility] w/o evidence of bleeding. 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 She has iodine allergy and received prednisone prior to recent [facility] w/o any adverse reaction.     Hospital Course:  Admitted to hospital medicine as transfer from Dr Cooley [facility] for high risk PCI.  LHC LM 95%, LAD 9-100%, Lcx,  RCA 75%.  H/o episode of Afib with RVR on 4/27 placed on pradaxa which was transitioned to heparin gtt for procedure purposes.  She will need to be transitioned back to pradaxa for discharge.  Currently SR/SB.  Patient has dye allergy, prep to begin 5/6 (orders placed by Interventional Cardiology), [facility] in AM 5/7.   She was noted to be hypertensive so metoprolol was changed to coreg, however this proved to be slight too much and she was hypotensive to 70's requiring fluid bolus.  BB changed back to metoprolol 25mg bid.      Patient is a [employer], she has been turned down by Dr. Germain/ CTS.  Dr. Tellez reviewed films and agreed to high risk PCI.     Dispo: home to primary cardiologist post procedure.     Interval History: Resting on bedside having breakfast, family in the room. She denies needs or complaints, understand prep procedure and NPO at midnight. She denies chest pain, palpitations, or shortness of breath. Denies any acute events or distress overnight.     Review of Systems   Constitutional: Negative for activity change, appetite change and fatigue.   HENT: Negative for congestion, sore throat and trouble swallowing.    Respiratory: Negative for cough and shortness of breath.    Cardiovascular: Negative for chest pain, palpitations and leg swelling.   Gastrointestinal: Negative for abdominal pain, constipation, diarrhea, nausea and vomiting.   Genitourinary: Negative for decreased urine volume, difficulty urinating and hematuria.   Musculoskeletal: Positive for back pain. Negative for myalgias.   Neurological: Negative for dizziness, weakness, light-headedness, numbness and headaches.   Hematological: Does not bruise/bleed easily.   Psychiatric/Behavioral: Negative for agitation, decreased concentration and sleep disturbance. The patient is not nervous/anxious.      Objective:     Vital Signs (Most Recent):  Temp: 97.4 °F (36.3 °C) (05/06/18 1051)  Pulse: (!) 55 (05/06/18 1057)  Resp: 18 (05/06/18  1051)  BP: 117/74 (05/06/18 1051)  SpO2: 98 % (05/06/18 1051) Vital Signs (24h Range):  Temp:  [97.3 °F (36.3 °C)-98.3 °F (36.8 °C)] 97.4 °F (36.3 °C)  Pulse:  [54-98] 55  Resp:  [18-20] 18  SpO2:  [94 %-99 %] 98 %  BP: ()/(39-74) 117/74     Weight: 75.1 kg (165 lb 9.1 oz)  Body mass index is 25.17 kg/m².    Intake/Output Summary (Last 24 hours) at 05/06/18 1234  Last data filed at 05/06/18 0500   Gross per 24 hour   Intake          1569.55 ml   Output              500 ml   Net          1069.55 ml      Physical Exam   Constitutional: She is oriented to person, place, and time. She appears well-developed and well-nourished. No distress.   HENT:   Head: Normocephalic and atraumatic.   Right Ear: External ear normal.   Left Ear: External ear normal.   Nose: Nose normal.   Eyes: Conjunctivae and EOM are normal.   Neck: Normal range of motion. Neck supple. No JVD present.   Cardiovascular: Normal rate, regular rhythm, normal heart sounds and intact distal pulses.    No murmur heard.  Pulmonary/Chest: Effort normal and breath sounds normal. No respiratory distress. She has no wheezes. She has no rales.   Abdominal: Soft. Bowel sounds are normal. She exhibits no distension and no mass. There is no tenderness. There is no guarding.   Musculoskeletal: Normal range of motion. She exhibits no edema.   Neurological: She is alert and oriented to person, place, and time. No cranial nerve deficit or sensory deficit. Coordination normal.   Skin: Skin is warm and dry. Capillary refill takes 2 to 3 seconds. No rash noted. She is not diaphoretic. No erythema.   Psychiatric: She has a normal mood and affect. Her behavior is normal. Judgment and thought content normal.   Nursing note and vitals reviewed.    Significant Labs:   CBC:     Recent Labs  Lab 05/05/18  0647   WBC 12.58   HGB 11.6*   HCT 36.7*        CMP:     Recent Labs  Lab 05/06/18  0600      K 4.5      CO2 29   *   BUN 25*   CREATININE 0.9    CALCIUM 9.5   ANIONGAP 4*   EGFRNONAA >60.0     Significant Imaging: I have reviewed all pertinent imaging results/findings within the past 24 hours.     2d cfd echo: 5/4/18   1 - Upper limit of normal left ventricular enlargement.     2 - Normal left ventricular systolic function (EF 65-70%).     3 - Severe left atrial enlargement.     4 - No wall motion abnormalities.     5 - Indeterminate LV diastolic function.     6 - Normal right ventricular systolic function .     7 - Trivial to mild mitral regurgitation.     8 - Trivial to mild tricuspid regurgitation.     9 - The estimated PA systolic pressure is 28 mmHg.     Assessment/Plan:      * Coronary artery disease involving native coronary artery of native heart without angina pectoris    - transferred to Dr. Tellez for high risk PCI  - heparin gtt two fold to cover h/o Afib with RVR and prep for LHC on Monday  - on GDMT, b/p was elevated attempted transition of metoprolol to coreg but too potent coupled with ACEI  Hypotensive so gave 500ml bolus x 2.    - patient asymptomatic  - needs DYE prep prior to LHC (ordered)  - tele monitor continuous           Paroxysmal A-fib    - episode OSH, started on pradaxa, unknown if she ever was sent home with this prior to transfer, consider switching to eliquis or xarelto  - currently SR/ SB  - on heparin gtt to bridge to PCI then she will need to resume NOAC post procedure.   - f/u with EP           Essential hypertension    - hypertensive on admit metop changed to coreg but too potent coupled with ACEI, hypotensive gave fluids, resolving, changed coreg back to metop to 25mg bid  - may need to hold ACE or decrease ACE in am.           Other hyperlipidemia    Atorvastatin changed to 80mg  - lipid panel  Chol 113    HDL 40  LDL 47.4        Type 2 diabetes mellitus, without long-term current use of insulin    - oral hypoglycemics held  - hgb a1 5.5  - SSI for now and diabetic/ card diet           Chronic low back pain    -  tylenol and zanaflex for now             VTE Risk Mitigation         Ordered     heparin 25,000 units in dextrose 5% 250 mL (100 units/mL) infusion  Continuous      05/04/18 0457     heparin 25,000 units in dextrose 5% 250 mL (100 units/mL) bolus from bag; ADDITIONAL PRN BOLUS  As needed (PRN)      05/04/18 0457     heparin 25,000 units in dextrose 5% 250 mL (100 units/mL) bolus from bag; ADDITIONAL PRN BOLUS  As needed (PRN)      05/04/18 0457     IP VTE HIGH RISK PATIENT  Once      05/04/18 0453     Place sequential compression device  Until discontinued      05/04/18 0453          Karen Mejia, DNP, AG-ACNP, BC  Department of Hospital Medicine  [facility]

## 2018-05-06 NOTE — PLAN OF CARE
Problem: Patient Care Overview  Goal: Plan of Care Review  Outcome: Ongoing (interventions implemented as appropriate)  Plan of care reviewed with patient. Pt verbalized understanding. Pt AAOX4. Pt free from falls, injuries and trauma.  Pt free from skin breakdown.  Pt VSS and in NAD.  Pt afebrile.  Pt had no complaints of SOB, N/V or CP.  Pt c/o back pain. Pain moderately controlled with prn pain medications. Pt ambulated with walker and standby assistance to  with nonskid foot ware on. Adequate UOP this shift. No BM this shift.  Heparin gtt continuous. Anti x a drawn q 6. Heparin gtt titrations made per nomogram (see mar). Pt had uneventful evening. Pt in low locked bed with personal items and call light within reach. Fall precautions maintained. Daughter remained at pt's bedside.

## 2018-05-06 NOTE — PLAN OF CARE
Problem: Patient Care Overview  Goal: Plan of Care Review  Outcome: Revised  Pt free of falls/trauma/injuries.  Denies c/o SOB or CP. Pt being tx with PO analgesics for back pain.  Generalized skin remains CDI; No edema noted. Metoprolol held d/t hypotension.  Plan to do [facility] Monday 5/7/18.  Pt tolerating plan of care

## 2018-05-07 LAB
ABO + RH BLD: NORMAL
ANION GAP SERPL CALC-SCNC: 11 MMOL/L
ANION GAP SERPL CALC-SCNC: 7 MMOL/L
ANION GAP SERPL CALC-SCNC: 8 MMOL/L
APTT BLDCRRT: 36 SEC
BASOPHILS # BLD AUTO: 0.02 K/UL
BASOPHILS # BLD AUTO: 0.04 K/UL
BASOPHILS NFR BLD: 0.1 %
BASOPHILS NFR BLD: 0.4 %
BLD GP AB SCN CELLS X3 SERPL QL: NORMAL
BUN SERPL-MCNC: 21 MG/DL
BUN SERPL-MCNC: 22 MG/DL
BUN SERPL-MCNC: 23 MG/DL
CALCIUM SERPL-MCNC: 9.6 MG/DL
CALCIUM SERPL-MCNC: 9.7 MG/DL
CALCIUM SERPL-MCNC: 9.9 MG/DL
CHLORIDE SERPL-SCNC: 107 MMOL/L
CO2 SERPL-SCNC: 20 MMOL/L
CO2 SERPL-SCNC: 23 MMOL/L
CO2 SERPL-SCNC: 23 MMOL/L
CREAT SERPL-MCNC: 1.4 MG/DL
CREAT SERPL-MCNC: 1.4 MG/DL
CREAT SERPL-MCNC: 1.5 MG/DL
DIFFERENTIAL METHOD: ABNORMAL
DIFFERENTIAL METHOD: ABNORMAL
EOSINOPHIL # BLD AUTO: 0 K/UL
EOSINOPHIL # BLD AUTO: 0 K/UL
EOSINOPHIL NFR BLD: 0 %
EOSINOPHIL NFR BLD: 0.1 %
ERYTHROCYTE [DISTWIDTH] IN BLOOD BY AUTOMATED COUNT: 13.9 %
ERYTHROCYTE [DISTWIDTH] IN BLOOD BY AUTOMATED COUNT: 13.9 %
EST. GFR  (AFRICAN AMERICAN): 38.7 ML/MIN/1.73 M^2
EST. GFR  (AFRICAN AMERICAN): 42.1 ML/MIN/1.73 M^2
EST. GFR  (AFRICAN AMERICAN): 42.1 ML/MIN/1.73 M^2
EST. GFR  (NON AFRICAN AMERICAN): 33.6 ML/MIN/1.73 M^2
EST. GFR  (NON AFRICAN AMERICAN): 36.5 ML/MIN/1.73 M^2
EST. GFR  (NON AFRICAN AMERICAN): 36.5 ML/MIN/1.73 M^2
FACT X PPP CHRO-ACNC: 0.36 IU/ML
GLUCOSE SERPL-MCNC: 270 MG/DL
GLUCOSE SERPL-MCNC: 304 MG/DL
GLUCOSE SERPL-MCNC: 368 MG/DL
HCT VFR BLD AUTO: 35.5 %
HCT VFR BLD AUTO: 37.1 %
HGB BLD-MCNC: 11.3 G/DL
HGB BLD-MCNC: 11.9 G/DL
IMM GRANULOCYTES # BLD AUTO: 0.18 K/UL
IMM GRANULOCYTES # BLD AUTO: 0.3 K/UL
IMM GRANULOCYTES NFR BLD AUTO: 1.6 %
IMM GRANULOCYTES NFR BLD AUTO: 2.2 %
INR PPP: 1
LYMPHOCYTES # BLD AUTO: 1.6 K/UL
LYMPHOCYTES # BLD AUTO: 1.9 K/UL
LYMPHOCYTES NFR BLD: 14 %
LYMPHOCYTES NFR BLD: 14.4 %
MAGNESIUM SERPL-MCNC: 1.8 MG/DL
MCH RBC QN AUTO: 29.4 PG
MCH RBC QN AUTO: 29.8 PG
MCHC RBC AUTO-ENTMCNC: 31.8 G/DL
MCHC RBC AUTO-ENTMCNC: 32.1 G/DL
MCV RBC AUTO: 92 FL
MCV RBC AUTO: 93 FL
MONOCYTES # BLD AUTO: 0.2 K/UL
MONOCYTES # BLD AUTO: 0.3 K/UL
MONOCYTES NFR BLD: 1.6 %
MONOCYTES NFR BLD: 2.2 %
NEUTROPHILS # BLD AUTO: 11 K/UL
NEUTROPHILS # BLD AUTO: 9.3 K/UL
NEUTROPHILS NFR BLD: 81.5 %
NEUTROPHILS NFR BLD: 81.9 %
NRBC BLD-RTO: 0 /100 WBC
NRBC BLD-RTO: 0 /100 WBC
PLATELET # BLD AUTO: 300 K/UL
PLATELET # BLD AUTO: 330 K/UL
PMV BLD AUTO: 11 FL
PMV BLD AUTO: 11.1 FL
POCT GLUCOSE: 261 MG/DL (ref 70–110)
POCT GLUCOSE: 356 MG/DL (ref 70–110)
POTASSIUM SERPL-SCNC: 4.9 MMOL/L
POTASSIUM SERPL-SCNC: 4.9 MMOL/L
POTASSIUM SERPL-SCNC: 5.2 MMOL/L
PROTHROMBIN TIME: 10.3 SEC
RBC # BLD AUTO: 3.85 M/UL
RBC # BLD AUTO: 4 M/UL
SODIUM SERPL-SCNC: 137 MMOL/L
SODIUM SERPL-SCNC: 138 MMOL/L
SODIUM SERPL-SCNC: 138 MMOL/L
WBC # BLD AUTO: 11.35 K/UL
WBC # BLD AUTO: 13.46 K/UL

## 2018-05-07 PROCEDURE — 25000003 PHARM REV CODE 250: Performed by: STUDENT IN AN ORGANIZED HEALTH CARE EDUCATION/TRAINING PROGRAM

## 2018-05-07 PROCEDURE — 63600175 PHARM REV CODE 636 W HCPCS: Performed by: NURSE PRACTITIONER

## 2018-05-07 PROCEDURE — 36415 COLL VENOUS BLD VENIPUNCTURE: CPT

## 2018-05-07 PROCEDURE — 80048 BASIC METABOLIC PNL TOTAL CA: CPT

## 2018-05-07 PROCEDURE — 80048 BASIC METABOLIC PNL TOTAL CA: CPT | Mod: 91

## 2018-05-07 PROCEDURE — 85610 PROTHROMBIN TIME: CPT

## 2018-05-07 PROCEDURE — 63600175 PHARM REV CODE 636 W HCPCS: Performed by: HOSPITALIST

## 2018-05-07 PROCEDURE — 85520 HEPARIN ASSAY: CPT

## 2018-05-07 PROCEDURE — 85730 THROMBOPLASTIN TIME PARTIAL: CPT

## 2018-05-07 PROCEDURE — 86901 BLOOD TYPING SEROLOGIC RH(D): CPT

## 2018-05-07 PROCEDURE — 93005 ELECTROCARDIOGRAM TRACING: CPT

## 2018-05-07 PROCEDURE — 20600001 HC STEP DOWN PRIVATE ROOM

## 2018-05-07 PROCEDURE — 63600175 PHARM REV CODE 636 W HCPCS: Performed by: STUDENT IN AN ORGANIZED HEALTH CARE EDUCATION/TRAINING PROGRAM

## 2018-05-07 PROCEDURE — 25000003 PHARM REV CODE 250: Performed by: HOSPITALIST

## 2018-05-07 PROCEDURE — 93010 ELECTROCARDIOGRAM REPORT: CPT | Mod: ,,, | Performed by: INTERNAL MEDICINE

## 2018-05-07 PROCEDURE — 99233 SBSQ HOSP IP/OBS HIGH 50: CPT | Mod: ,,, | Performed by: NURSE PRACTITIONER

## 2018-05-07 PROCEDURE — 63600175 PHARM REV CODE 636 W HCPCS

## 2018-05-07 PROCEDURE — 85025 COMPLETE CBC W/AUTO DIFF WBC: CPT | Mod: 91

## 2018-05-07 PROCEDURE — 83735 ASSAY OF MAGNESIUM: CPT

## 2018-05-07 PROCEDURE — 25000003 PHARM REV CODE 250: Performed by: NURSE PRACTITIONER

## 2018-05-07 RX ORDER — NITROGLYCERIN 0.4 MG/1
TABLET SUBLINGUAL
Status: COMPLETED
Start: 2018-05-07 | End: 2018-05-07

## 2018-05-07 RX ORDER — MAGNESIUM SULFATE HEPTAHYDRATE 40 MG/ML
2 INJECTION, SOLUTION INTRAVENOUS ONCE
Status: COMPLETED | OUTPATIENT
Start: 2018-05-07 | End: 2018-05-07

## 2018-05-07 RX ORDER — NITROGLYCERIN 0.4 MG/1
0.4 TABLET SUBLINGUAL EVERY 5 MIN PRN
Status: DISCONTINUED | OUTPATIENT
Start: 2018-05-07 | End: 2018-05-10 | Stop reason: HOSPADM

## 2018-05-07 RX ORDER — SODIUM CHLORIDE 9 MG/ML
3 INJECTION, SOLUTION INTRAVENOUS CONTINUOUS
Status: ACTIVE | OUTPATIENT
Start: 2018-05-07 | End: 2018-05-07

## 2018-05-07 RX ORDER — SODIUM CHLORIDE 9 MG/ML
1000 INJECTION, SOLUTION INTRAVENOUS CONTINUOUS
Status: DISCONTINUED | OUTPATIENT
Start: 2018-05-07 | End: 2018-05-08

## 2018-05-07 RX ORDER — DIPHENHYDRAMINE HCL 50 MG
50 CAPSULE ORAL ONCE
Status: DISCONTINUED | OUTPATIENT
Start: 2018-05-07 | End: 2018-05-08

## 2018-05-07 RX ORDER — METOPROLOL TARTRATE 25 MG/1
12.5 TABLET ORAL 2 TIMES DAILY
Status: DISCONTINUED | OUTPATIENT
Start: 2018-05-07 | End: 2018-05-10

## 2018-05-07 RX ADMIN — NITROGLYCERIN: 0.4 TABLET SUBLINGUAL at 07:05

## 2018-05-07 RX ADMIN — GABAPENTIN 800 MG: 400 CAPSULE ORAL at 04:05

## 2018-05-07 RX ADMIN — GABAPENTIN 800 MG: 400 CAPSULE ORAL at 09:05

## 2018-05-07 RX ADMIN — PANTOPRAZOLE SODIUM 40 MG: 40 TABLET, DELAYED RELEASE ORAL at 09:05

## 2018-05-07 RX ADMIN — HEPARIN SODIUM 11 UNITS/KG/HR: 10000 INJECTION, SOLUTION INTRAVENOUS at 11:05

## 2018-05-07 RX ADMIN — INSULIN ASPART 5 UNITS: 100 INJECTION, SOLUTION INTRAVENOUS; SUBCUTANEOUS at 12:05

## 2018-05-07 RX ADMIN — CLOPIDOGREL 75 MG: 75 TABLET, FILM COATED ORAL at 09:05

## 2018-05-07 RX ADMIN — OXYBUTYNIN CHLORIDE 5 MG: 5 TABLET, FILM COATED, EXTENDED RELEASE ORAL at 05:05

## 2018-05-07 RX ADMIN — DIPHENHYDRAMINE HYDROCHLORIDE 50 MG: 50 CAPSULE ORAL at 06:05

## 2018-05-07 RX ADMIN — FAMOTIDINE 20 MG: 20 TABLET, FILM COATED ORAL at 09:05

## 2018-05-07 RX ADMIN — MAGNESIUM SULFATE IN WATER 2 G: 40 INJECTION, SOLUTION INTRAVENOUS at 09:05

## 2018-05-07 RX ADMIN — CETIRIZINE HYDROCHLORIDE 10 MG: 5 TABLET, FILM COATED ORAL at 09:05

## 2018-05-07 RX ADMIN — ATORVASTATIN CALCIUM 40 MG: 20 TABLET, FILM COATED ORAL at 09:05

## 2018-05-07 RX ADMIN — PREDNISONE 50 MG: 20 TABLET ORAL at 06:05

## 2018-05-07 RX ADMIN — GABAPENTIN 800 MG: 400 CAPSULE ORAL at 12:05

## 2018-05-07 RX ADMIN — ACETAMINOPHEN 650 MG: 325 TABLET ORAL at 09:05

## 2018-05-07 RX ADMIN — ASPIRIN 81 MG: 81 TABLET, COATED ORAL at 09:05

## 2018-05-07 RX ADMIN — LISINOPRIL 20 MG: 20 TABLET ORAL at 09:05

## 2018-05-07 RX ADMIN — SODIUM CHLORIDE 1000 ML: 0.9 INJECTION, SOLUTION INTRAVENOUS at 10:05

## 2018-05-07 RX ADMIN — Medication 12.5 MG: at 09:05

## 2018-05-07 RX ADMIN — VITAMIN D, TAB 1000IU (100/BT) 1000 UNITS: 25 TAB at 09:05

## 2018-05-07 RX ADMIN — TIZANIDINE 4 MG: 4 TABLET ORAL at 09:05

## 2018-05-07 RX ADMIN — NITROGLYCERIN 0.4 MG: 0.4 TABLET SUBLINGUAL at 09:05

## 2018-05-07 RX ADMIN — TIZANIDINE 4 MG: 4 TABLET ORAL at 06:05

## 2018-05-07 RX ADMIN — TIZANIDINE 4 MG: 4 TABLET ORAL at 12:05

## 2018-05-07 RX ADMIN — INSULIN ASPART 3 UNITS: 100 INJECTION, SOLUTION INTRAVENOUS; SUBCUTANEOUS at 04:05

## 2018-05-07 RX ADMIN — THERA TABS 1 TABLET: TAB at 09:05

## 2018-05-07 RX ADMIN — CITALOPRAM HYDROBROMIDE 10 MG: 10 TABLET ORAL at 09:05

## 2018-05-07 NOTE — PLAN OF CARE
Problem: Patient Care Overview  Goal: Plan of Care Review  Outcome: Revised  Plan of care discussed with patient. Patient is free of fall/trauma/injury. Remains on heparin gtt at 11u/kg. Next antx-a in am. Plan to do left heart cath in am depending on kidney function= 22/1.4 Denies CP, SOB, or pain/discomfort. All questions addressed. Will continue to monitor

## 2018-05-07 NOTE — PLAN OF CARE
Problem: Patient Care Overview  Goal: Plan of Care Review  Plan of care discussed with patient. Fall precautions in place. Patient has no complaints of pain. Discussed medications and care. Pt NPO at midnight. [facility] has no questions at this time.White board updated. Bed locked in lowest position. Side rails up x2. Will continue to monitor.

## 2018-05-08 LAB
ANION GAP SERPL CALC-SCNC: 8 MMOL/L
BUN SERPL-MCNC: 23 MG/DL
CALCIUM SERPL-MCNC: 9.4 MG/DL
CHLORIDE SERPL-SCNC: 110 MMOL/L
CO2 SERPL-SCNC: 22 MMOL/L
CREAT SERPL-MCNC: 1 MG/DL
EST. GFR  (AFRICAN AMERICAN): >60 ML/MIN/1.73 M^2
EST. GFR  (NON AFRICAN AMERICAN): 54.9 ML/MIN/1.73 M^2
FACT X PPP CHRO-ACNC: 0.4 IU/ML
GLUCOSE SERPL-MCNC: 137 MG/DL
MAGNESIUM SERPL-MCNC: 2.1 MG/DL
POCT GLUCOSE: 115 MG/DL (ref 70–110)
POCT GLUCOSE: 120 MG/DL (ref 70–110)
POCT GLUCOSE: 167 MG/DL (ref 70–110)
POCT GLUCOSE: 225 MG/DL (ref 70–110)
POCT GLUCOSE: 289 MG/DL (ref 70–110)
POTASSIUM SERPL-SCNC: 4.4 MMOL/L
SODIUM SERPL-SCNC: 140 MMOL/L

## 2018-05-08 PROCEDURE — 25000003 PHARM REV CODE 250: Performed by: INTERNAL MEDICINE

## 2018-05-08 PROCEDURE — 63600175 PHARM REV CODE 636 W HCPCS: Performed by: INTERNAL MEDICINE

## 2018-05-08 PROCEDURE — 20600001 HC STEP DOWN PRIVATE ROOM

## 2018-05-08 PROCEDURE — 83735 ASSAY OF MAGNESIUM: CPT

## 2018-05-08 PROCEDURE — 94760 N-INVAS EAR/PLS OXIMETRY 1: CPT

## 2018-05-08 PROCEDURE — 99232 SBSQ HOSP IP/OBS MODERATE 35: CPT | Mod: ,,, | Performed by: NURSE PRACTITIONER

## 2018-05-08 PROCEDURE — 80048 BASIC METABOLIC PNL TOTAL CA: CPT

## 2018-05-08 PROCEDURE — 25000003 PHARM REV CODE 250: Performed by: NURSE PRACTITIONER

## 2018-05-08 PROCEDURE — 85520 HEPARIN ASSAY: CPT

## 2018-05-08 PROCEDURE — 94761 N-INVAS EAR/PLS OXIMETRY MLT: CPT

## 2018-05-08 PROCEDURE — 36415 COLL VENOUS BLD VENIPUNCTURE: CPT

## 2018-05-08 PROCEDURE — 63600175 PHARM REV CODE 636 W HCPCS: Performed by: HOSPITALIST

## 2018-05-08 PROCEDURE — 25000003 PHARM REV CODE 250: Performed by: STUDENT IN AN ORGANIZED HEALTH CARE EDUCATION/TRAINING PROGRAM

## 2018-05-08 PROCEDURE — 25000003 PHARM REV CODE 250: Performed by: HOSPITALIST

## 2018-05-08 RX ORDER — DIPHENHYDRAMINE HCL 50 MG
50 CAPSULE ORAL EVERY 6 HOURS
Status: COMPLETED | OUTPATIENT
Start: 2018-05-08 | End: 2018-05-09

## 2018-05-08 RX ORDER — OXYCODONE AND ACETAMINOPHEN 7.5; 325 MG/1; MG/1
1 TABLET ORAL EVERY 4 HOURS PRN
Status: DISCONTINUED | OUTPATIENT
Start: 2018-05-08 | End: 2018-05-10 | Stop reason: HOSPADM

## 2018-05-08 RX ORDER — FAMOTIDINE 20 MG/1
20 TABLET, FILM COATED ORAL 2 TIMES DAILY
Status: DISPENSED | OUTPATIENT
Start: 2018-05-08 | End: 2018-05-09

## 2018-05-08 RX ADMIN — PREDNISONE 50 MG: 20 TABLET ORAL at 04:05

## 2018-05-08 RX ADMIN — DIPHENHYDRAMINE HYDROCHLORIDE 50 MG: 50 CAPSULE ORAL at 05:05

## 2018-05-08 RX ADMIN — CITALOPRAM HYDROBROMIDE 10 MG: 10 TABLET ORAL at 09:05

## 2018-05-08 RX ADMIN — GABAPENTIN 800 MG: 400 CAPSULE ORAL at 08:05

## 2018-05-08 RX ADMIN — INSULIN ASPART 2 UNITS: 100 INJECTION, SOLUTION INTRAVENOUS; SUBCUTANEOUS at 12:05

## 2018-05-08 RX ADMIN — HEPARIN SODIUM 11 UNITS/KG/HR: 10000 INJECTION, SOLUTION INTRAVENOUS at 03:05

## 2018-05-08 RX ADMIN — GABAPENTIN 800 MG: 400 CAPSULE ORAL at 12:05

## 2018-05-08 RX ADMIN — DIPHENHYDRAMINE HYDROCHLORIDE 50 MG: 50 CAPSULE ORAL at 10:05

## 2018-05-08 RX ADMIN — CLOPIDOGREL 75 MG: 75 TABLET, FILM COATED ORAL at 09:05

## 2018-05-08 RX ADMIN — GABAPENTIN 800 MG: 400 CAPSULE ORAL at 09:05

## 2018-05-08 RX ADMIN — Medication 12.5 MG: at 09:05

## 2018-05-08 RX ADMIN — CETIRIZINE HYDROCHLORIDE 10 MG: 5 TABLET, FILM COATED ORAL at 09:05

## 2018-05-08 RX ADMIN — FAMOTIDINE 20 MG: 20 TABLET, FILM COATED ORAL at 10:05

## 2018-05-08 RX ADMIN — PREDNISONE 50 MG: 20 TABLET ORAL at 10:05

## 2018-05-08 RX ADMIN — ATORVASTATIN CALCIUM 40 MG: 20 TABLET, FILM COATED ORAL at 08:05

## 2018-05-08 RX ADMIN — TIZANIDINE 4 MG: 4 TABLET ORAL at 05:05

## 2018-05-08 RX ADMIN — OXYCODONE HYDROCHLORIDE AND ACETAMINOPHEN 1 TABLET: 7.5; 325 TABLET ORAL at 03:05

## 2018-05-08 RX ADMIN — ASPIRIN 81 MG: 81 TABLET, COATED ORAL at 09:05

## 2018-05-08 RX ADMIN — FAMOTIDINE 20 MG: 20 TABLET, FILM COATED ORAL at 05:05

## 2018-05-08 RX ADMIN — Medication 12.5 MG: at 08:05

## 2018-05-08 RX ADMIN — GABAPENTIN 800 MG: 400 CAPSULE ORAL at 04:05

## 2018-05-08 RX ADMIN — PANTOPRAZOLE SODIUM 40 MG: 40 TABLET, DELAYED RELEASE ORAL at 09:05

## 2018-05-08 RX ADMIN — OXYBUTYNIN CHLORIDE 5 MG: 5 TABLET, FILM COATED, EXTENDED RELEASE ORAL at 05:05

## 2018-05-08 RX ADMIN — VITAMIN D, TAB 1000IU (100/BT) 1000 UNITS: 25 TAB at 09:05

## 2018-05-08 RX ADMIN — THERA TABS 1 TABLET: TAB at 09:05

## 2018-05-08 RX ADMIN — INSULIN ASPART 1 UNITS: 100 INJECTION, SOLUTION INTRAVENOUS; SUBCUTANEOUS at 10:05

## 2018-05-08 NOTE — ASSESSMENT & PLAN NOTE
- Significant multivessel disease including left main disease with history of DM - deferred by CT surgery for CABG due to comorbidities  - will proceed with [facility] with multivessel high risk PCI of the prox/mid RCA and distal LM tomorrow.   - Continue plavix 75 mg once daily - check PRU  - c/w ASA 81 mg QD  - c/w lipitor 40 mg QD  - c/w toprol XL 50 mg QD (hold if HR is <50)  - c/w lisinopril    Keep NPO after MN on Sunday night  - Access: R CFA 6F  - Catheters: JL4/JR4 diagnostics. JR 4 guide  - Serum Creatinine/CrCl: 1.0  - Allergies: Iodine allergy (throat swelling; skin rash) - prep ordered for today - prednisone 50 mg x 3 doses (Q6H), pepcid 20 mg X3 doses (Q12H), benadryl 50 mg x 3 doses (Q6H)  - Pre-Hydration: 3 cc/kg/hr IV, continuous, for 1 hour, Pre-Procedure  - Pre-Op Med: Diphenhydramine (Benadryl) 50 mg, Oral, Once, Pre-Procedure   - Pt is a JACQUES candidate and understands the importance of taking Ticagrelor 90 mg BID or Plavix 75 mg daily or Prasugrel 10 mg daily for at least one year, understands that in case of receiving a drug coated stent the failure to comply with dual anti-platelet therapy is likely to result in stent clotting, heart attack and death.   - The patient understands the risks and benefits and wishes to go ahead with the procedure.  - All patient's questions were answered.  - The risks, benefits & alternatives of the procedure were explained to the patient.   - The risks of coronary angiography include but are not limited to: Bleeding, infection, heart rhythm abnormalities, allergic reactions, kidney injury requiring dilaysis, limb loss, stroke and death.   - Should stenting be indicated, the patient has agreed to dual anti-platelet therapy for 1-consecutive year with a drug-eluting stent and a minimum of 1-month with the use of a bare metal stent.   - Additionally, pt is aware that non-compliance is likely to result in stent clotting with heart attack, heart failure, and/or death.  - The  risks of moderate sedation include hypotension, respiratory depression, arrhythmias, bronchospasm, & death.   - Informed consent was obtained & the patient is agreeable to proceed with the procedure.  - This patient was discussed with the attending interventional cardiologist who agrees with the above assessment & plan.

## 2018-05-08 NOTE — ASSESSMENT & PLAN NOTE
-transfer from [facility] for high risk PCI  -continue heparin gtt (indcations for CAD and Afib with RVR)   -on GDMT, BP was elevated at admission, transitioned metoprolol to coreg however noted to become hypoTN and result in ADARSH   -BB and lisinopril held and given 1 liter IVF for resuscitation   -BP now WNL and SCr return WNL   -resumed low dose BB   -continue holding lisinopril >>> resume post procedure   -plans for LHC in AM >>> iodine allergy prep initiated   -continue tele monitoring  -EKG and SL NTG PRN chest pain  -will need outpt follow up with Cardiology and referral to cardiac rehab

## 2018-05-08 NOTE — PROGRESS NOTES
[facility]  Interventional Cardiology  Progress Note    Patient Name: Elías Muñoz  MRN: [id number]  Admission Date: 5/4/2018  Hospital Length of Stay: 4 days  Code Status: Full Code   Attending Physician: Terry Cotter MD   Primary Care Physician: Calin Casas MD  Principal Problem:Coronary artery disease involving native coronary artery of native heart without angina pectoris    Subjective:     Interval History: Creatinine improved. At baseline now. No episode of angina reported during last 48 hrs    Objective:     Vital Signs (Most Recent):  Temp: 96.8 °F (36 °C) (05/08/18 1149)  Pulse: (!) 57 (05/08/18 1149)  Resp: 16 (05/08/18 1149)  BP: (!) 157/72 (05/08/18 1149)  SpO2: 96 % (05/08/18 1149) Vital Signs (24h Range):  Temp:  [96.8 °F (36 °C)-98.8 °F (37.1 °C)] 96.8 °F (36 °C)  Pulse:  [55-88] 57  Resp:  [16-18] 16  SpO2:  [95 %-99 %] 96 %  BP: (115-167)/(55-78) 157/72     Weight: 75.1 kg (165 lb 9.1 oz)  Body mass index is 25.17 kg/m².    SpO2: 96 %  O2 Device (Oxygen Therapy): room air      Intake/Output Summary (Last 24 hours) at 05/08/18 1323  Last data filed at 05/08/18 0600   Gross per 24 hour   Intake          2553.16 ml   Output             2400 ml   Net           153.16 ml       Lines/Drains/Airways     Central Venous Catheter Line                 Port A Cath Single Lumen right subclavian -- days          Peripheral Intravenous Line                 Peripheral IV - Single Lumen 05/06/18 2330 Right Forearm 1 day                Physical Exam   Constitutional: She appears well-developed and well-nourished.   HENT:   Head: Normocephalic and atraumatic.   Right Ear: External ear normal.   Left Ear: External ear normal.   Eyes: Conjunctivae and EOM are normal. Pupils are equal, round, and reactive to light.   Neck: Normal range of motion. Neck supple. No JVD present. No thyromegaly present.   Cardiovascular: Normal rate, regular rhythm, S1 normal, S2 normal, normal heart  sounds and intact distal pulses.  Exam reveals no gallop, no S3 and no friction rub.    No murmur heard.  Pulses:       Radial pulses are 2+ on the right side, and 2+ on the left side.        Femoral pulses are 2+ on the right side, and 2+ on the left side.       Dorsalis pedis pulses are 2+ on the right side, and 2+ on the left side.        Posterior tibial pulses are 2+ on the right side, and 2+ on the left side.   Pulmonary/Chest: Effort normal. No stridor. She has no wheezes. She has no rales. She exhibits no tenderness.   Abdominal: Soft. Bowel sounds are normal. She exhibits no distension. There is no tenderness. There is no rebound and no guarding.   Musculoskeletal: Normal range of motion. She exhibits no edema or tenderness.   Psychiatric: She has a normal mood and affect.       Significant Labs: All pertinent lab results from the last 24 hours have been reviewed.        Assessment and Plan:     Patient is a 78 y.o. female presenting with:    * Coronary artery disease involving native coronary artery of native heart without angina pectoris    - Significant multivessel disease including left main disease with history of DM - deferred by CT surgery for CABG due to comorbidities  - will proceed with [facility] with multivessel high risk PCI of the prox/mid RCA and distal LM tomorrow.   - Continue plavix 75 mg once daily - check PRU  - c/w ASA 81 mg QD  - c/w lipitor 40 mg QD  - c/w toprol XL 50 mg QD (hold if HR is <50)  - c/w lisinopril    Keep NPO after MN   - Access: R radial 6Fr slender  - Catheters: JL4/JR4 diagnostics. JR 4 guide  - Serum Creatinine/CrCl: 1.0  - Allergies: Iodine allergy (throat swelling; skin rash) - prep ordered for today - prednisone 50 mg x 3 doses (Q6H), pepcid 20 mg X3 doses (Q12H), benadryl 50 mg x 3 doses (Q6H)  - Pre-Hydration: 3 cc/kg/hr IV, continuous, for 1 hour, Pre-Procedure  - Pre-Op Med: Diphenhydramine (Benadryl) 50 mg, Oral, Once, Pre-Procedure   - Pt is a JACQUES candidate and  understands the importance of taking Ticagrelor 90 mg BID or Plavix 75 mg daily or Prasugrel 10 mg daily for at least one year, understands that in case of receiving a drug coated stent the failure to comply with dual anti-platelet therapy is likely to result in stent clotting, heart attack and death.   - The patient understands the risks and benefits and wishes to go ahead with the procedure.  - All patient's questions were answered.  - The risks, benefits & alternatives of the procedure were explained to the patient.   - The risks of coronary angiography include but are not limited to: Bleeding, infection, heart rhythm abnormalities, allergic reactions, kidney injury requiring dilaysis, limb loss, stroke and death.   - Should stenting be indicated, the patient has agreed to dual anti-platelet therapy for 1-consecutive year with a drug-eluting stent and a minimum of 1-month with the use of a bare metal stent.   - Additionally, pt is aware that non-compliance is likely to result in stent clotting with heart attack, heart failure, and/or death.  - The risks of moderate sedation include hypotension, respiratory depression, arrhythmias, bronchospasm, & death.   - Informed consent was obtained & the patient is agreeable to proceed with the procedure.  - This patient was discussed with the attending interventional cardiologist who agrees with the above assessment & plan.              VTE Risk Mitigation         Ordered     heparin 25,000 units in dextrose 5% 250 mL (100 units/mL) infusion  Continuous      05/04/18 0457     heparin 25,000 units in dextrose 5% 250 mL (100 units/mL) bolus from bag; ADDITIONAL PRN BOLUS  As needed (PRN)      05/04/18 0457     heparin 25,000 units in dextrose 5% 250 mL (100 units/mL) bolus from bag; ADDITIONAL PRN BOLUS  As needed (PRN)      05/04/18 0457     IP VTE HIGH RISK PATIENT  Once      05/04/18 0453     Place sequential compression device  Until discontinued      05/04/18 0453           Erik North MD  Interventional Cardiology  [facility]

## 2018-05-08 NOTE — ASSESSMENT & PLAN NOTE
- hypertensive on admit metop changed to coreg but too potent coupled with ACEI, hypotensive gave fluids, resolving, changed coreg back to metop to 25mg bid now down to 12.5 bid for acute bump in CR and increased renal perfusion, titrate back up once renals improve  - plan to hold ACE in am until renal function improves.

## 2018-05-08 NOTE — PLAN OF CARE
05/08/18 0822   Discharge Reassessment   Assessment Type Discharge Planning Reassessment   Do you have any problems affording any of your prescribed medications? No   Discharge Plan A Home with family   Discharge Plan B Home with family;Home Health

## 2018-05-08 NOTE — ASSESSMENT & PLAN NOTE
-episode OSH  -currently SR/ SB  -on heparin gtt to bridge to PCI then she will need to resume DOAC   -started on pradaxa at OSH >> can consider switching to eliquis or xarelto  -continue low dose BB  -continue tele monitoring  -outpt follow up with Cardiology

## 2018-05-08 NOTE — ASSESSMENT & PLAN NOTE
-see above for details  -BP WNL to slightly elevated  -continue low dose BB  -resume lisinopril post procedure  -continue to monitor

## 2018-05-08 NOTE — SUBJECTIVE & OBJECTIVE
Interval History: Creatinine improved. At baseline now. No episode of angina reported during last 48 hrs    Objective:     Vital Signs (Most Recent):  Temp: 96.8 °F (36 °C) (05/08/18 1149)  Pulse: (!) 57 (05/08/18 1149)  Resp: 16 (05/08/18 1149)  BP: (!) 157/72 (05/08/18 1149)  SpO2: 96 % (05/08/18 1149) Vital Signs (24h Range):  Temp:  [96.8 °F (36 °C)-98.8 °F (37.1 °C)] 96.8 °F (36 °C)  Pulse:  [55-88] 57  Resp:  [16-18] 16  SpO2:  [95 %-99 %] 96 %  BP: (115-167)/(55-78) 157/72     Weight: 75.1 kg (165 lb 9.1 oz)  Body mass index is 25.17 kg/m².    SpO2: 96 %  O2 Device (Oxygen Therapy): room air      Intake/Output Summary (Last 24 hours) at 05/08/18 1323  Last data filed at 05/08/18 0600   Gross per 24 hour   Intake          2553.16 ml   Output             2400 ml   Net           153.16 ml       Lines/Drains/Airways     Central Venous Catheter Line                 Port A Cath Single Lumen right subclavian -- days          Peripheral Intravenous Line                 Peripheral IV - Single Lumen 05/06/18 2330 Right Forearm 1 day                Physical Exam   Constitutional: She appears well-developed and well-nourished.   HENT:   Head: Normocephalic and atraumatic.   Right Ear: External ear normal.   Left Ear: External ear normal.   Eyes: Conjunctivae and EOM are normal. Pupils are equal, round, and reactive to light.   Neck: Normal range of motion. Neck supple. No JVD present. No thyromegaly present.   Cardiovascular: Normal rate, regular rhythm, S1 normal, S2 normal, normal heart sounds and intact distal pulses.  Exam reveals no gallop, no S3 and no friction rub.    No murmur heard.  Pulses:       Radial pulses are 2+ on the right side, and 2+ on the left side.        Femoral pulses are 2+ on the right side, and 2+ on the left side.       Dorsalis pedis pulses are 2+ on the right side, and 2+ on the left side.        Posterior tibial pulses are 2+ on the right side, and 2+ on the left side.   Pulmonary/Chest:  Effort normal. No stridor. She has no wheezes. She has no rales. She exhibits no tenderness.   Abdominal: Soft. Bowel sounds are normal. She exhibits no distension. There is no tenderness. There is no rebound and no guarding.   Musculoskeletal: Normal range of motion. She exhibits no edema or tenderness.   Psychiatric: She has a normal mood and affect.       Significant Labs: All pertinent lab results from the last 24 hours have been reviewed.

## 2018-05-08 NOTE — PLAN OF CARE
Problem: Patient Care Overview  Goal: Plan of Care Review  Outcome: Ongoing (interventions implemented as appropriate)  POC reviewed with patient and patients daughtert. Both verbalized understanding.   Questions and concerns addressed. No acute events overnight or further issues with chest pain.  Patient responded well to tylenol and nitroglycerin. Heparin drip remained on during the night. Pt NPO at midnight.   Pt progressing toward goals. Will continue to monitor. See flow sheets for full assessment and VS info

## 2018-05-08 NOTE — PROGRESS NOTES
[facility] Hospital Medicine  Progress Note    Patient Name: Elías Muñoz  MRN: [id number]  Patient Class: IP- Inpatient   Admission Date: 5/4/2018  Length of Stay: 3 days  Attending Physician: Al Montes MD  Primary Care Provider: Calin Casas MD    [facility] Medicine Team: [facility]OSP MED J Krystal Mercedes NP    Subjective:     Principal Problem:Coronary artery disease involving native coronary artery of native heart without angina pectoris    HPI:  78 year old female with h/o HTN, HLD, DM2, paroxysmal afib, CAD, lumbar DJD with chronic low back pain, fall risk due to unsteady gait, breast cancer s/p left mastectomy who is a Gnosticist. She was [place] [facility] [place]25 from clinic with asymptomatic hypotension and bradycardia. Patients in clinic her BP was 60/30 but she felt fine.  EKG on admission showed sinus bradycardia with HR 42 and first degree ABV. Metoprolol was held due to bradycardia. No chest pain and SOB. Troponin negative. Patient developed afib w RVR on 04/27 and started on BB, NOAC pradaxa. Patient was evaluated by cardiology while in hospital for bradycardia and then afib w RVR.      Patient had elective diagnostic LHC on 04/30 which revealed multivessel disease including left main artery, LAD, CFx and RCA. Normal LVSF     Case discussed with Dr. Germain from [facility] and deemed patient is not a CABG candidate due to anatomy. Dr. Germain reviewed the [facility] with Dr. Jose Tellez from interventional cardiology and suggested transfer to [facility] for high risk PCI.      Patient currently on heparin infusion since [facility] w/o evidence of bleeding. She is a Gnosticist and refusing any blood product transfusion. She has iodine allergy and received prednisone prior to recent [facility] w/o any adverse reaction.     Hospital Course:  Admitted to hospital medicine as transfer from Dr Cooley [facility] for high risk PCI.  LHC LM 95%, LAD 9-100%, Lcx,  RCA 75%.  H/o episode of Afib with RVR on 4/27 placed on pradaxa which was transitioned to heparin gtt for procedure purposes.  She will need to be transitioned back to pradaxa for discharge.  Currently SR/SB. While waiting for procedure she was noted to be hypertensive so metoprolol was changed to coreg, however this proved to be too much and she was hypotensive to 70's requiring fluid bolus.  BB changed back to metoprolol 25mg bid and also held for a time being.  Despite 1L fluid resusc, she showed evidence of ADARSH 48 hrs later which pre-empted her cath scheduled for 5/7.  Patient given more fluids, b/p meds dropped more to aid renal return.  Patient has dye allergy, prep to begin 5/8 (orders placed) if CR improves she will get staged angiogram 5/8.       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  Dr. Tellez reviewed films and agreed to high risk PCI.     Dispo: home to primary cardiologist post procedure.     Interval History: Resting in bed, family at bedside.  She understands what happened to her kidneys and why procedure has been delayed.   She denies CP/SOB.  She has chronic back issues.     Review of Systems   Constitutional: Negative for activity change, appetite change and fatigue.   HENT: Negative for congestion, sore throat and trouble swallowing.    Respiratory: Negative for cough and shortness of breath.    Cardiovascular: Negative for chest pain, palpitations and leg swelling.   Gastrointestinal: Negative for abdominal pain, constipation, diarrhea, nausea and vomiting.   Genitourinary: Negative for decreased urine volume, difficulty urinating and hematuria.   Musculoskeletal: Positive for back pain. Negative for myalgias.   Neurological: Negative for dizziness, weakness, light-headedness, numbness and headaches.   Hematological: Does not bruise/bleed easily.   Psychiatric/Behavioral: Negative for agitation, decreased concentration and sleep disturbance. The patient is  not nervous/anxious.      Objective:     Vital Signs (Most Recent):  Temp: 98.8 °F (37.1 °C) (05/07/18 1545)  Pulse: 84 (05/07/18 1800)  Resp: 18 (05/07/18 1545)  BP: (!) 148/68 (05/07/18 1907)  SpO2: 98 % (05/07/18 1545) Vital Signs (24h Range):  Temp:  [98.1 °F (36.7 °C)-98.8 °F (37.1 °C)] 98.8 °F (37.1 °C)  Pulse:  [67-88] 84  Resp:  [18-20] 18  SpO2:  [95 %-99 %] 98 %  BP: (123-167)/(55-78) 148/68     Weight: 75.1 kg (165 lb 9.1 oz)  Body mass index is 25.17 kg/m².    Intake/Output Summary (Last 24 hours) at 05/07/18 1924  Last data filed at 05/07/18 1800   Gross per 24 hour   Intake           2911.8 ml   Output             2000 ml   Net            911.8 ml      Physical Exam   Constitutional: She is oriented to person, place, and time. She appears well-developed and well-nourished. No distress.   HENT:   Head: Normocephalic and atraumatic.   Right Ear: External ear normal.   Left Ear: External ear normal.   Nose: Nose normal.   Eyes: Conjunctivae and EOM are normal.   Neck: Normal range of motion. Neck supple. No JVD present.   Cardiovascular: Normal rate, regular rhythm, normal heart sounds and intact distal pulses.    No murmur heard.  Pulmonary/Chest: Effort normal and breath sounds normal. No respiratory distress. She has no wheezes. She has no rales.   Abdominal: Soft. Bowel sounds are normal. She exhibits no distension and no mass. There is no tenderness. There is no guarding.   Musculoskeletal: Normal range of motion. She exhibits no edema.   Neurological: She is alert and oriented to person, place, and time. No cranial nerve deficit or sensory deficit. Coordination normal.   Skin: Skin is warm and dry. Capillary refill takes 2 to 3 seconds. No rash noted. She is not diaphoretic. No erythema.   Psychiatric: She has a normal mood and affect. Her behavior is normal. Judgment and thought content normal.   Nursing note and vitals reviewed.    Significant Labs:   CBC:     Recent Labs  Lab 05/07/18  0454  05/07/18  0831   WBC 11.35 13.46*   HGB 11.9* 11.3*   HCT 37.1 35.5*    300     CMP:     Recent Labs  Lab 05/06/18  0600 05/07/18  0454 05/07/18  0831    138 138   K 4.5 4.9 4.9    107 107   CO2 29 20* 23   * 368* 270*   BUN 25* 21 22   CREATININE 0.9 1.5* 1.4   CALCIUM 9.5 9.9 9.7   ANIONGAP 4* 11 8   EGFRNONAA >60.0 33.6* 36.5*     Significant Imaging: I have reviewed all pertinent imaging results/findings within the past 24 hours.     2d cfd echo: 5/4/18   1 - Upper limit of normal left ventricular enlargement.     2 - Normal left ventricular systolic function (EF 65-70%).     3 - Severe left atrial enlargement.     4 - No wall motion abnormalities.     5 - Indeterminate LV diastolic function.     6 - Normal right ventricular systolic function .     7 - Trivial to mild mitral regurgitation.     8 - Trivial to mild tricuspid regurgitation.     9 - The estimated PA systolic pressure is 28 mmHg.     Assessment/Plan:      * Coronary artery disease involving native coronary artery of native heart without angina pectoris    - transferred to Dr. Tellez for high risk PCI  - heparin gtt two fold to cover h/o Afib with RVR and prep for LHC on Tuesday possibly if renal function improves, otherwise they will bring her back at a later date to complete.  However it will be a staged procedure for all vessels.   - on GDMT, b/p was elevated attempted transition of metoprolol to coreg but too potent coupled with ACEI  Hypotensive so gave 500ml bolus x 2.    - patient asymptomatic  - needs DYE prep prior to LHC (ordered)  - tele monitor continuous           Essential hypertension    - hypertensive on admit metop changed to coreg but too potent coupled with ACEI, hypotensive gave fluids, resolving, changed coreg back to metop to 25mg bid now down to 12.5 bid for acute bump in CR and increased renal perfusion, titrate back up once renals improve  - plan to hold ACE in am until renal function improves.            Paroxysmal A-fib    - episode OSH, started on pradaxa, unknown if she ever was sent home with this prior to transfer, consider switching to eliquis or xarelto  - currently SR/ SB  - on heparin gtt to bridge to PCI then she will need to resume NOAC post procedure.   - f/u with EP           Other hyperlipidemia    Atorvastatin changed to 80mg  - lipid panel  Chol 113    HDL 40  LDL 47.4        Type 2 diabetes mellitus, without long-term current use of insulin    - oral hypoglycemics held  - hgb a1 5.5  - SSI for now and diabetic/ card diet           Chronic low back pain    - tylenol and zanaflex for now             VTE Risk Mitigation         Ordered     heparin 25,000 units in dextrose 5% 250 mL (100 units/mL) infusion  Continuous      05/04/18 0457     heparin 25,000 units in dextrose 5% 250 mL (100 units/mL) bolus from bag; ADDITIONAL PRN BOLUS  As needed (PRN)      05/04/18 0457     heparin 25,000 units in dextrose 5% 250 mL (100 units/mL) bolus from bag; ADDITIONAL PRN BOLUS  As needed (PRN)      05/04/18 0457     IP VTE HIGH RISK PATIENT  Once      05/04/18 0453     Place sequential compression device  Until discontinued      05/04/18 0453              Krystal Mercedes NP  Department of Hospital Medicine   [facility]  Spectra:  [phone]  Pager: [phone]

## 2018-05-08 NOTE — NURSING
Notified Med J team that pt complaining of jaw pain 7/10. Vital signs stable. /54. HR 75. MD ordered another nitro tablet along with tylenol. MD came to bedside. Continuing to monitor patient.    Pain decreasing to 5/10. Patient says previous 2 nitro tabs helped the pain earlier.

## 2018-05-08 NOTE — ASSESSMENT & PLAN NOTE
-due to hypotension  -peak SCr 1.5   -now back to WNL  -resumed BB  -hold ACEi for now >> resume when appropriate  -continue to monitor

## 2018-05-08 NOTE — PLAN OF CARE
Problem: Patient Care Overview  Goal: Plan of Care Review  Outcome: Ongoing (interventions implemented as appropriate)  Plan of care updated with patient. Patient is pending left heart cath in AM. Dye prep ordered. No additional insulin with meals, no falls. Patient continued on heparin infusion.

## 2018-05-08 NOTE — ASSESSMENT & PLAN NOTE
- transferred to Dr. Tellez for high risk PCI  - heparin gtt two fold to cover h/o Afib with RVR and prep for LHC on Tuesday possibly if renal function improves, otherwise they will bring her back at a later date to complete.  However it will be a staged procedure for all vessels.   - on GDMT, b/p was elevated attempted transition of metoprolol to coreg but too potent coupled with ACEI  Hypotensive so gave 500ml bolus x 2.    - patient asymptomatic  - needs DYE prep prior to LHC (ordered)  - tele monitor continuous

## 2018-05-08 NOTE — ASSESSMENT & PLAN NOTE
-transitioned to high intensity statin  -lipid panel: Chol 113, , HDL 40, LDL 47.4  -cardiac diet in house

## 2018-05-08 NOTE — SUBJECTIVE & OBJECTIVE
Interval History: Resting in bed, family at bedside.  She understands what happened to her kidneys and why procedure has been delayed.   She denies CP/SOB.  She has chronic back issues.     Review of Systems   Constitutional: Negative for activity change, appetite change and fatigue.   HENT: Negative for congestion, sore throat and trouble swallowing.    Respiratory: Negative for cough and shortness of breath.    Cardiovascular: Negative for chest pain, palpitations and leg swelling.   Gastrointestinal: Negative for abdominal pain, constipation, diarrhea, nausea and vomiting.   Genitourinary: Negative for decreased urine volume, difficulty urinating and hematuria.   Musculoskeletal: Positive for back pain. Negative for myalgias.   Neurological: Negative for dizziness, weakness, light-headedness, numbness and headaches.   Hematological: Does not bruise/bleed easily.   Psychiatric/Behavioral: Negative for agitation, decreased concentration and sleep disturbance. The patient is not nervous/anxious.      Objective:     Vital Signs (Most Recent):  Temp: 98.8 °F (37.1 °C) (05/07/18 1545)  Pulse: 84 (05/07/18 1800)  Resp: 18 (05/07/18 1545)  BP: (!) 148/68 (05/07/18 1907)  SpO2: 98 % (05/07/18 1545) Vital Signs (24h Range):  Temp:  [98.1 °F (36.7 °C)-98.8 °F (37.1 °C)] 98.8 °F (37.1 °C)  Pulse:  [67-88] 84  Resp:  [18-20] 18  SpO2:  [95 %-99 %] 98 %  BP: (123-167)/(55-78) 148/68     Weight: 75.1 kg (165 lb 9.1 oz)  Body mass index is 25.17 kg/m².    Intake/Output Summary (Last 24 hours) at 05/07/18 1924  Last data filed at 05/07/18 1800   Gross per 24 hour   Intake           2911.8 ml   Output             2000 ml   Net            911.8 ml      Physical Exam   Constitutional: She is oriented to person, place, and time. She appears well-developed and well-nourished. No distress.   HENT:   Head: Normocephalic and atraumatic.   Right Ear: External ear normal.   Left Ear: External ear normal.   Nose: Nose normal.   Eyes:  Conjunctivae and EOM are normal.   Neck: Normal range of motion. Neck supple. No JVD present.   Cardiovascular: Normal rate, regular rhythm, normal heart sounds and intact distal pulses.    No murmur heard.  Pulmonary/Chest: Effort normal and breath sounds normal. No respiratory distress. She has no wheezes. She has no rales.   Abdominal: Soft. Bowel sounds are normal. She exhibits no distension and no mass. There is no tenderness. There is no guarding.   Musculoskeletal: Normal range of motion. She exhibits no edema.   Neurological: She is alert and oriented to person, place, and time. No cranial nerve deficit or sensory deficit. Coordination normal.   Skin: Skin is warm and dry. Capillary refill takes 2 to 3 seconds. No rash noted. She is not diaphoretic. No erythema.   Psychiatric: She has a normal mood and affect. Her behavior is normal. Judgment and thought content normal.   Nursing note and vitals reviewed.    Significant Labs:   CBC:     Recent Labs  Lab 05/07/18  0454 05/07/18  0831   WBC 11.35 13.46*   HGB 11.9* 11.3*   HCT 37.1 35.5*    300     CMP:     Recent Labs  Lab 05/06/18  0600 05/07/18  0454 05/07/18  0831    138 138   K 4.5 4.9 4.9    107 107   CO2 29 20* 23   * 368* 270*   BUN 25* 21 22   CREATININE 0.9 1.5* 1.4   CALCIUM 9.5 9.9 9.7   ANIONGAP 4* 11 8   EGFRNONAA >60.0 33.6* 36.5*     Significant Imaging: I have reviewed all pertinent imaging results/findings within the past 24 hours.     2d cfd echo: 5/4/18   1 - Upper limit of normal left ventricular enlargement.     2 - Normal left ventricular systolic function (EF 65-70%).     3 - Severe left atrial enlargement.     4 - No wall motion abnormalities.     5 - Indeterminate LV diastolic function.     6 - Normal right ventricular systolic function .     7 - Trivial to mild mitral regurgitation.     8 - Trivial to mild tricuspid regurgitation.     9 - The estimated PA systolic pressure is 28 mmHg.

## 2018-05-08 NOTE — SUBJECTIVE & OBJECTIVE
"Interval History: Resting in bed, family at bedside. Has no chest pain at current, episodes overnight "scared her", she is anticipatory of angiogram procedure. She currently denies chest pain, shortness of breath, palpitations, or N/V.     Review of Systems   Constitutional: Negative for activity change, appetite change and fatigue.   HENT: Negative for congestion, sore throat and trouble swallowing.    Respiratory: Negative for cough and shortness of breath.    Cardiovascular: Positive for chest pain (squeezing with radiation to neck and jaw overnight, resolved with SL NTG). Negative for palpitations and leg swelling.   Gastrointestinal: Negative for abdominal pain, constipation, diarrhea, nausea and vomiting.   Genitourinary: Negative for decreased urine volume, difficulty urinating and hematuria.   Musculoskeletal: Positive for back pain (chronic). Negative for myalgias.   Neurological: Negative for dizziness, weakness, light-headedness, numbness and headaches.   Hematological: Does not bruise/bleed easily.   Psychiatric/Behavioral: Negative for agitation, decreased concentration and sleep disturbance. The patient is not nervous/anxious.      Objective:     Vital Signs (Most Recent):  Temp: 98.7 °F (37.1 °C) (05/08/18 1459)  Pulse: 65 (05/08/18 1459)  Resp: 16 (05/08/18 1459)  BP: (!) 155/70 (05/08/18 1459)  SpO2: 96 % (05/08/18 1459) Vital Signs (24h Range):  Temp:  [96.8 °F (36 °C)-98.8 °F (37.1 °C)] 98.7 °F (37.1 °C)  Pulse:  [55-88] 65  Resp:  [16-18] 16  SpO2:  [95 %-99 %] 96 %  BP: (115-167)/(55-78) 155/70     Weight: 75.1 kg (165 lb 9.1 oz)  Body mass index is 25.17 kg/m².    Intake/Output Summary (Last 24 hours) at 05/08/18 1514  Last data filed at 05/08/18 0600   Gross per 24 hour   Intake          1646.96 ml   Output             2400 ml   Net          -753.04 ml      Physical Exam   Constitutional: She is oriented to person, place, and time. She appears well-developed and well-nourished. No distress. "   HENT:   Head: Normocephalic and atraumatic.   Right Ear: External ear normal.   Left Ear: External ear normal.   Nose: Nose normal.   Eyes: Conjunctivae and EOM are normal.   Neck: Normal range of motion. Neck supple. No JVD present.   Cardiovascular: Normal rate, regular rhythm, normal heart sounds and intact distal pulses.    No murmur heard.  Pulmonary/Chest: Effort normal and breath sounds normal. No respiratory distress. She has no wheezes. She has no rales.   Abdominal: Soft. Bowel sounds are normal. She exhibits no distension and no mass. There is no tenderness. There is no guarding.   Musculoskeletal: Normal range of motion. She exhibits no edema.   Neurological: She is alert and oriented to person, place, and time. No cranial nerve deficit or sensory deficit. Coordination normal.   Skin: Skin is warm and dry. Capillary refill takes 2 to 3 seconds. No rash noted. She is not diaphoretic. No erythema.   Psychiatric: She has a normal mood and affect. Her behavior is normal. Judgment and thought content normal.   Nursing note and vitals reviewed.    Significant Labs:   CBC:     Recent Labs  Lab 05/07/18  0454 05/07/18  0831   WBC 11.35 13.46*   HGB 11.9* 11.3*   HCT 37.1 35.5*    300     CMP:     Recent Labs  Lab 05/07/18  0831 05/07/18  2108 05/08/18  0518    137 140   K 4.9 5.2* 4.4    107 110   CO2 23 23 22*   * 304* 137*   BUN 22 23 23   CREATININE 1.4 1.4 1.0   CALCIUM 9.7 9.6 9.4   ANIONGAP 8 7* 8   EGFRNONAA 36.5* 36.5* 54.9*     Significant Imaging: I have reviewed all pertinent imaging results/findings within the past 24 hours.     2d cfd echo: 5/4/18   1 - Upper limit of normal left ventricular enlargement.     2 - Normal left ventricular systolic function (EF 65-70%).     3 - Severe left atrial enlargement.     4 - No wall motion abnormalities.     5 - Indeterminate LV diastolic function.     6 - Normal right ventricular systolic function .     7 - Trivial to mild mitral  regurgitation.     8 - Trivial to mild tricuspid regurgitation.     9 - The estimated PA systolic pressure is 28 mmHg.

## 2018-05-08 NOTE — PROGRESS NOTES
[facility] Hospital Medicine  Progress Note    Patient Name: Elías Muñoz  MRN: [id number]  Patient Class: IP- Inpatient   Admission Date: 5/4/2018  Length of Stay: 4 days  Attending Physician: Terry Cotter MD  Primary Care Provider: Calin Casas MD    [facility]: [facility] MED J Karen Mejia NP    Subjective:     Principal Problem:Coronary artery disease involving native coronary artery of native heart without angina pectoris    HPI:  Ms. Muñoz is a 78 year old female with past medical history of HTN, HLD, DM2, paroxysmal afib, CAD, lumbar DJD with chronic low back pain, fall risk due to unsteady gait, and breast cancer s/p left mastectomy who is a Muslim. She was admitted to [facility] on 04/25 from clinic with asymptomatic hypotension and bradycardia. Noted from clinic her BP was 60/30 but she felt fine. At time of admission to [facility] EKG noted sinus bradycardia with HR 42 and first degree ABV, metoprolol was held due to bradycardia, she denied chest pain or shortness of breath, and troponin negative. She then developed AFib with RVR on 04/27 and BB increased with initiation of NOAC pradaxa. Cardiology evaluated at OSH due to bradycardia and then afib w RVR. She then had elective diagnostic LHC on 04/30 which revealed multivessel disease including left main artery, LAD, CFx and RCA (LM 95%, LAD 9-100%, Lcx, RCA 75%); normal LVSF.      Case was discussed with Dr. Germain from [facility] and deemed patient is not a CABG candidate due to anatomy. Dr. Germain then reviewed cath films with Dr. Jose Tellez from Interventional cardiology and suggested transfer to [facility] for high risk PCI. At time of transfer she had heparin infusion since LHC procedure w/o evidence of bleeding.  She has iodine allergy and received prednisone prior to recent C w/o any adverse reaction. She is a Muslim and refusing any blood product transfusion.    The patient was  "admitted to the Hospital Medicine Service for further evaluation and management.     Hospital Course:  Ms. Guadalupe was transferred from [facility] on 5/4 for high risk PCI. While waiting for procedure she was noted to be hypertensive; metoprolol transitioned to coreg, however noted to have hypoTN with resultant rise in SCr despite 1 liter IV fluid resuscitation. BB/lisinopril held, 5/7 cath delayed. Episodes of chest pain relieved with SL NTG overnight 5/7, no acute EKG changes. Her SCr has returned to WNL, iodine allergy prep initiated with plans for C 5/9.    Disposition plans: Home with sister post procedure, will need cardiac rehab referral and outpt follow up with primary Cardiologist.     Interval History: Resting in bed, family at bedside. Has no chest pain at current, episodes overnight "scared her", she is anticipatory of angiogram procedure. She currently denies chest pain, shortness of breath, palpitations, or N/V.     Review of Systems   Constitutional: Negative for activity change, appetite change and fatigue.   HENT: Negative for congestion, sore throat and trouble swallowing.    Respiratory: Negative for cough and shortness of breath.    Cardiovascular: Positive for chest pain (squeezing with radiation to neck and jaw overnight, resolved with SL NTG). Negative for palpitations and leg swelling.   Gastrointestinal: Negative for abdominal pain, constipation, diarrhea, nausea and vomiting.   Genitourinary: Negative for decreased urine volume, difficulty urinating and hematuria.   Musculoskeletal: Positive for back pain (chronic). Negative for myalgias.   Neurological: Negative for dizziness, weakness, light-headedness, numbness and headaches.   Hematological: Does not bruise/bleed easily.   Psychiatric/Behavioral: Negative for agitation, decreased concentration and sleep disturbance. The patient is not nervous/anxious.      Objective:     Vital Signs (Most Recent):  Temp: 98.7 °F " (37.1 °C) (05/08/18 1459)  Pulse: 65 (05/08/18 1459)  Resp: 16 (05/08/18 1459)  BP: (!) 155/70 (05/08/18 1459)  SpO2: 96 % (05/08/18 1459) Vital Signs (24h Range):  Temp:  [96.8 °F (36 °C)-98.8 °F (37.1 °C)] 98.7 °F (37.1 °C)  Pulse:  [55-88] 65  Resp:  [16-18] 16  SpO2:  [95 %-99 %] 96 %  BP: (115-167)/(55-78) 155/70     Weight: 75.1 kg (165 lb 9.1 oz)  Body mass index is 25.17 kg/m².    Intake/Output Summary (Last 24 hours) at 05/08/18 1514  Last data filed at 05/08/18 0600   Gross per 24 hour   Intake          1646.96 ml   Output             2400 ml   Net          -753.04 ml      Physical Exam   Constitutional: She is oriented to person, place, and time. She appears well-developed and well-nourished. No distress.   HENT:   Head: Normocephalic and atraumatic.   Right Ear: External ear normal.   Left Ear: External ear normal.   Nose: Nose normal.   Eyes: Conjunctivae and EOM are normal.   Neck: Normal range of motion. Neck supple. No JVD present.   Cardiovascular: Normal rate, regular rhythm, normal heart sounds and intact distal pulses.    No murmur heard.  Pulmonary/Chest: Effort normal and breath sounds normal. No respiratory distress. She has no wheezes. She has no rales.   Abdominal: Soft. Bowel sounds are normal. She exhibits no distension and no mass. There is no tenderness. There is no guarding.   Musculoskeletal: Normal range of motion. She exhibits no edema.   Neurological: She is alert and oriented to person, place, and time. No cranial nerve deficit or sensory deficit. Coordination normal.   Skin: Skin is warm and dry. Capillary refill takes 2 to 3 seconds. No rash noted. She is not diaphoretic. No erythema.   Psychiatric: She has a normal mood and affect. Her behavior is normal. Judgment and thought content normal.   Nursing note and vitals reviewed.    Significant Labs:   CBC:     Recent Labs  Lab 05/07/18  0454 05/07/18  0831   WBC 11.35 13.46*   HGB 11.9* 11.3*   HCT 37.1 35.5*    300      CMP:     Recent Labs  Lab 05/07/18  0831 05/07/18  2108 05/08/18  0518    137 140   K 4.9 5.2* 4.4    107 110   CO2 23 23 22*   * 304* 137*   BUN 22 23 23   CREATININE 1.4 1.4 1.0   CALCIUM 9.7 9.6 9.4   ANIONGAP 8 7* 8   EGFRNONAA 36.5* 36.5* 54.9*     Significant Imaging: I have reviewed all pertinent imaging results/findings within the past 24 hours.     2d cfd echo: 5/4/18   1 - Upper limit of normal left ventricular enlargement.     2 - Normal left ventricular systolic function (EF 65-70%).     3 - Severe left atrial enlargement.     4 - No wall motion abnormalities.     5 - Indeterminate LV diastolic function.     6 - Normal right ventricular systolic function .     7 - Trivial to mild mitral regurgitation.     8 - Trivial to mild tricuspid regurgitation.     9 - The estimated PA systolic pressure is 28 mmHg.     Assessment/Plan:      * Coronary artery disease involving native coronary artery of native heart without angina pectoris    -transfer from [facility] for high risk PCI  -continue heparin gtt (indcations for CAD and Afib with RVR)   -on GDMT, BP was elevated at admission, transitioned metoprolol to coreg however noted to become hypoTN and result in ADARSH   -BB and lisinopril held and given 1 liter IVF for resuscitation   -BP now WNL and SCr return WNL   -resumed low dose BB   -continue holding lisinopril >>> resume post procedure   -plans for C in AM >>> iodine allergy prep initiated   -continue tele monitoring  -EKG and SL NTG PRN chest pain  -will need outpt follow up with Cardiology and referral to cardiac rehab         ADARSH (acute kidney injury)    -due to hypotension  -peak SCr 1.5   -now back to WNL  -resumed BB  -hold ACEi for now >> resume when appropriate  -continue to monitor        Paroxysmal A-fib    -episode OSH  -currently SR/ SB  -on heparin gtt to bridge to PCI then she will need to resume DOAC   -started on pradaxa at OSH >> can consider switching to eliquis or  xarelto  -continue low dose BB  -continue tele monitoring  -outpt follow up with Cardiology         Essential hypertension    -see above for details  -BP WNL to slightly elevated  -continue low dose BB  -resume lisinopril post procedure  -continue to monitor         Other hyperlipidemia    -transitioned to high intensity statin  -lipid panel: Chol 113, , HDL 40, LDL 47.4  -cardiac diet in house        Type 2 diabetes mellitus, without long-term current use of insulin    -oral hypoglycemics held  -HgA1C 5.5  -SSI with AC/HS POCT  -cardiac/ADA diet in house         Chronic low back pain    -chronic, no acute issues  -continue home tylenol and zanaflex         Refusal of blood transfusions as patient is [employer]    -no acute issues  -CBC stable, monitor over hospital course        Neuropathy    -no acute issues  -continue home gabapentin           VTE Risk Mitigation         Ordered     heparin 25,000 units in dextrose 5% 250 mL (100 units/mL) infusion  Continuous      05/04/18 0457     heparin 25,000 units in dextrose 5% 250 mL (100 units/mL) bolus from bag; ADDITIONAL PRN BOLUS  As needed (PRN)      05/04/18 0457     heparin 25,000 units in dextrose 5% 250 mL (100 units/mL) bolus from bag; ADDITIONAL PRN BOLUS  As needed (PRN)      05/04/18 0457     IP VTE HIGH RISK PATIENT  Once      05/04/18 0453     Place sequential compression device  Until discontinued      05/04/18 0453          Karen Mejia, DNP, AG-ACNP, BC  Department of Hospital Medicine  [facility]  Pager [phone]  [facility] [id number]

## 2018-05-08 NOTE — NURSING
Resume care from previous nurse, pt voice no complaints, lying in bed w/ bed in lowest position and locked. Call bell within reach, introduce myself to pt. Will continue to monitor pt status- pt has been npo since midnight- ns infusing at 75ml.hr and heparin at 11units/ 80.6- 8.9ml/hr by pump...

## 2018-05-09 LAB
ABO + RH BLD: NORMAL
ANION GAP SERPL CALC-SCNC: 9 MMOL/L
ANION GAP SERPL CALC-SCNC: 9 MMOL/L
BASOPHILS # BLD AUTO: 0.05 K/UL
BASOPHILS # BLD AUTO: 0.05 K/UL
BASOPHILS NFR BLD: 0.3 %
BASOPHILS NFR BLD: 0.3 %
BILIRUB UR QL STRIP: NEGATIVE
BLD GP AB SCN CELLS X3 SERPL QL: NORMAL
BUN SERPL-MCNC: 27 MG/DL
BUN SERPL-MCNC: 27 MG/DL
CALCIUM SERPL-MCNC: 9.7 MG/DL
CALCIUM SERPL-MCNC: 9.7 MG/DL
CHLORIDE SERPL-SCNC: 107 MMOL/L
CHLORIDE SERPL-SCNC: 107 MMOL/L
CLARITY UR REFRACT.AUTO: ABNORMAL
CO2 SERPL-SCNC: 22 MMOL/L
CO2 SERPL-SCNC: 22 MMOL/L
COLOR UR AUTO: YELLOW
CREAT SERPL-MCNC: 1.1 MG/DL
CREAT SERPL-MCNC: 1.1 MG/DL
DIFFERENTIAL METHOD: ABNORMAL
DIFFERENTIAL METHOD: ABNORMAL
EOSINOPHIL # BLD AUTO: 0 K/UL
EOSINOPHIL # BLD AUTO: 0 K/UL
EOSINOPHIL NFR BLD: 0.1 %
EOSINOPHIL NFR BLD: 0.1 %
ERYTHROCYTE [DISTWIDTH] IN BLOOD BY AUTOMATED COUNT: 14.6 %
ERYTHROCYTE [DISTWIDTH] IN BLOOD BY AUTOMATED COUNT: 14.6 %
EST. GFR  (AFRICAN AMERICAN): 56.4 ML/MIN/1.73 M^2
EST. GFR  (AFRICAN AMERICAN): 56.4 ML/MIN/1.73 M^2
EST. GFR  (NON AFRICAN AMERICAN): 48.9 ML/MIN/1.73 M^2
EST. GFR  (NON AFRICAN AMERICAN): 48.9 ML/MIN/1.73 M^2
FACT X PPP CHRO-ACNC: 0.36 IU/ML
GLUCOSE SERPL-MCNC: 224 MG/DL
GLUCOSE SERPL-MCNC: 224 MG/DL
GLUCOSE UR QL STRIP: ABNORMAL
HCT VFR BLD AUTO: 36.2 %
HCT VFR BLD AUTO: 36.2 %
HGB BLD-MCNC: 11 G/DL
HGB BLD-MCNC: 11 G/DL
HGB UR QL STRIP: ABNORMAL
IMM GRANULOCYTES # BLD AUTO: 0.74 K/UL
IMM GRANULOCYTES # BLD AUTO: 0.74 K/UL
IMM GRANULOCYTES NFR BLD AUTO: 4.3 %
IMM GRANULOCYTES NFR BLD AUTO: 4.3 %
KETONES UR QL STRIP: NEGATIVE
LEUKOCYTE ESTERASE UR QL STRIP: NEGATIVE
LYMPHOCYTES # BLD AUTO: 2.8 K/UL
LYMPHOCYTES # BLD AUTO: 2.8 K/UL
LYMPHOCYTES NFR BLD: 15.9 %
LYMPHOCYTES NFR BLD: 15.9 %
MAGNESIUM SERPL-MCNC: 1.8 MG/DL
MCH RBC QN AUTO: 28.9 PG
MCH RBC QN AUTO: 28.9 PG
MCHC RBC AUTO-ENTMCNC: 30.4 G/DL
MCHC RBC AUTO-ENTMCNC: 30.4 G/DL
MCV RBC AUTO: 95 FL
MCV RBC AUTO: 95 FL
MICROSCOPIC COMMENT: NORMAL
MONOCYTES # BLD AUTO: 0.8 K/UL
MONOCYTES # BLD AUTO: 0.8 K/UL
MONOCYTES NFR BLD: 4.3 %
MONOCYTES NFR BLD: 4.3 %
NEUTROPHILS # BLD AUTO: 13 K/UL
NEUTROPHILS # BLD AUTO: 13 K/UL
NEUTROPHILS NFR BLD: 75.1 %
NEUTROPHILS NFR BLD: 75.1 %
NITRITE UR QL STRIP: NEGATIVE
NRBC BLD-RTO: 0 /100 WBC
NRBC BLD-RTO: 0 /100 WBC
PH UR STRIP: 5 [PH] (ref 5–8)
PLATELET # BLD AUTO: 308 K/UL
PLATELET # BLD AUTO: 308 K/UL
PLATELET RESPONSE PLAVIX: 31 PRU
PMV BLD AUTO: 11.2 FL
PMV BLD AUTO: 11.2 FL
POCT GLUCOSE: 184 MG/DL (ref 70–110)
POCT GLUCOSE: 199 MG/DL (ref 70–110)
POCT GLUCOSE: 231 MG/DL (ref 70–110)
POTASSIUM SERPL-SCNC: 5.3 MMOL/L
POTASSIUM SERPL-SCNC: 5.3 MMOL/L
PROCALCITONIN SERPL IA-MCNC: <0.02 NG/ML
PROT UR QL STRIP: NEGATIVE
RBC # BLD AUTO: 3.81 M/UL
RBC # BLD AUTO: 3.81 M/UL
RBC #/AREA URNS AUTO: 2 /HPF (ref 0–4)
SODIUM SERPL-SCNC: 138 MMOL/L
SODIUM SERPL-SCNC: 138 MMOL/L
SP GR UR STRIP: >=1.03 (ref 1–1.03)
SQUAMOUS #/AREA URNS AUTO: 1 /HPF
URN SPEC COLLECT METH UR: ABNORMAL
UROBILINOGEN UR STRIP-ACNC: NEGATIVE EU/DL
WBC # BLD AUTO: 17.35 K/UL
WBC # BLD AUTO: 17.35 K/UL
WBC #/AREA URNS AUTO: 1 /HPF (ref 0–5)

## 2018-05-09 PROCEDURE — 85520 HEPARIN ASSAY: CPT

## 2018-05-09 PROCEDURE — 02703ZZ DILATION OF CORONARY ARTERY, ONE ARTERY, PERCUTANEOUS APPROACH: ICD-10-PCS | Performed by: INTERNAL MEDICINE

## 2018-05-09 PROCEDURE — 63600175 PHARM REV CODE 636 W HCPCS: Performed by: INTERNAL MEDICINE

## 2018-05-09 PROCEDURE — 83735 ASSAY OF MAGNESIUM: CPT

## 2018-05-09 PROCEDURE — 25000003 PHARM REV CODE 250: Performed by: NURSE PRACTITIONER

## 2018-05-09 PROCEDURE — 93005 ELECTROCARDIOGRAM TRACING: CPT

## 2018-05-09 PROCEDURE — 84145 PROCALCITONIN (PCT): CPT

## 2018-05-09 PROCEDURE — 027034Z DILATION OF CORONARY ARTERY, ONE ARTERY WITH DRUG-ELUTING INTRALUMINAL DEVICE, PERCUTANEOUS APPROACH: ICD-10-PCS | Performed by: INTERNAL MEDICINE

## 2018-05-09 PROCEDURE — 85576 BLOOD PLATELET AGGREGATION: CPT

## 2018-05-09 PROCEDURE — 92920 PRQ TRLUML C ANGIOP 1ART&/BR: CPT | Mod: LC,59,58, | Performed by: INTERNAL MEDICINE

## 2018-05-09 PROCEDURE — 99152 MOD SED SAME PHYS/QHP 5/>YRS: CPT | Mod: ,,, | Performed by: INTERNAL MEDICINE

## 2018-05-09 PROCEDURE — 92943 PRQ TRLUML REVSC CH OCC ANT: CPT | Mod: LD,58,, | Performed by: INTERNAL MEDICINE

## 2018-05-09 PROCEDURE — 25000003 PHARM REV CODE 250: Performed by: HOSPITALIST

## 2018-05-09 PROCEDURE — 27000014 CATH LAB PROCEDURE

## 2018-05-09 PROCEDURE — 25000003 PHARM REV CODE 250: Performed by: INTERNAL MEDICINE

## 2018-05-09 PROCEDURE — B2101ZZ FLUOROSCOPY OF SINGLE CORONARY ARTERY USING LOW OSMOLAR CONTRAST: ICD-10-PCS | Performed by: INTERNAL MEDICINE

## 2018-05-09 PROCEDURE — C1894 INTRO/SHEATH, NON-LASER: HCPCS

## 2018-05-09 PROCEDURE — 25000003 PHARM REV CODE 250: Performed by: STUDENT IN AN ORGANIZED HEALTH CARE EDUCATION/TRAINING PROGRAM

## 2018-05-09 PROCEDURE — 86850 RBC ANTIBODY SCREEN: CPT

## 2018-05-09 PROCEDURE — 80048 BASIC METABOLIC PNL TOTAL CA: CPT

## 2018-05-09 PROCEDURE — 99231 SBSQ HOSP IP/OBS SF/LOW 25: CPT | Mod: ,,, | Performed by: NURSE PRACTITIONER

## 2018-05-09 PROCEDURE — 63600175 PHARM REV CODE 636 W HCPCS

## 2018-05-09 PROCEDURE — 63600175 PHARM REV CODE 636 W HCPCS: Performed by: NURSE PRACTITIONER

## 2018-05-09 PROCEDURE — 20600001 HC STEP DOWN PRIVATE ROOM

## 2018-05-09 PROCEDURE — 93454 CORONARY ARTERY ANGIO S&I: CPT | Mod: 26,59,58, | Performed by: INTERNAL MEDICINE

## 2018-05-09 PROCEDURE — 36415 COLL VENOUS BLD VENIPUNCTURE: CPT

## 2018-05-09 PROCEDURE — 25000003 PHARM REV CODE 250

## 2018-05-09 PROCEDURE — 81001 URINALYSIS AUTO W/SCOPE: CPT

## 2018-05-09 PROCEDURE — 93010 ELECTROCARDIOGRAM REPORT: CPT | Mod: ,,, | Performed by: INTERNAL MEDICINE

## 2018-05-09 PROCEDURE — S0077 INJECTION, CLINDAMYCIN PHOSP: HCPCS

## 2018-05-09 PROCEDURE — 92928 PRQ TCAT PLMT NTRAC ST 1 LES: CPT | Mod: LM,59,58, | Performed by: INTERNAL MEDICINE

## 2018-05-09 PROCEDURE — 87086 URINE CULTURE/COLONY COUNT: CPT

## 2018-05-09 PROCEDURE — 85025 COMPLETE CBC W/AUTO DIFF WBC: CPT

## 2018-05-09 RX ORDER — SODIUM CHLORIDE 9 MG/ML
3 INJECTION, SOLUTION INTRAVENOUS CONTINUOUS
Status: DISCONTINUED | OUTPATIENT
Start: 2018-05-09 | End: 2018-05-09

## 2018-05-09 RX ORDER — DIPHENHYDRAMINE HCL 50 MG
50 CAPSULE ORAL ONCE
Status: COMPLETED | OUTPATIENT
Start: 2018-05-09 | End: 2018-05-09

## 2018-05-09 RX ORDER — FAMOTIDINE 20 MG/1
20 TABLET, FILM COATED ORAL SEE ADMIN INSTRUCTIONS
Status: DISCONTINUED | OUTPATIENT
Start: 2018-05-09 | End: 2018-05-10

## 2018-05-09 RX ORDER — DIPHENHYDRAMINE HCL 50 MG
50 CAPSULE ORAL ONCE
Status: DISCONTINUED | OUTPATIENT
Start: 2018-05-09 | End: 2018-05-09

## 2018-05-09 RX ORDER — DIPHENHYDRAMINE HCL 50 MG
50 CAPSULE ORAL EVERY 6 HOURS
Status: COMPLETED | OUTPATIENT
Start: 2018-05-09 | End: 2018-05-09

## 2018-05-09 RX ORDER — GABAPENTIN 400 MG/1
400 CAPSULE ORAL 3 TIMES DAILY
Status: DISCONTINUED | OUTPATIENT
Start: 2018-05-09 | End: 2018-05-10 | Stop reason: HOSPADM

## 2018-05-09 RX ORDER — MAGNESIUM SULFATE HEPTAHYDRATE 40 MG/ML
2 INJECTION, SOLUTION INTRAVENOUS ONCE
Status: COMPLETED | OUTPATIENT
Start: 2018-05-09 | End: 2018-05-09

## 2018-05-09 RX ORDER — ACETAMINOPHEN 325 MG/1
650 TABLET ORAL EVERY 4 HOURS PRN
Status: DISCONTINUED | OUTPATIENT
Start: 2018-05-09 | End: 2018-05-10 | Stop reason: HOSPADM

## 2018-05-09 RX ADMIN — OXYCODONE HYDROCHLORIDE AND ACETAMINOPHEN 1 TABLET: 7.5; 325 TABLET ORAL at 05:05

## 2018-05-09 RX ADMIN — ATORVASTATIN CALCIUM 40 MG: 20 TABLET, FILM COATED ORAL at 10:05

## 2018-05-09 RX ADMIN — Medication 12.5 MG: at 10:05

## 2018-05-09 RX ADMIN — OXYBUTYNIN CHLORIDE 5 MG: 5 TABLET, FILM COATED, EXTENDED RELEASE ORAL at 05:05

## 2018-05-09 RX ADMIN — FAMOTIDINE 20 MG: 20 TABLET, FILM COATED ORAL at 05:05

## 2018-05-09 RX ADMIN — THERA TABS 1 TABLET: TAB at 09:05

## 2018-05-09 RX ADMIN — CETIRIZINE HYDROCHLORIDE 10 MG: 5 TABLET, FILM COATED ORAL at 09:05

## 2018-05-09 RX ADMIN — OXYCODONE HYDROCHLORIDE AND ACETAMINOPHEN 1 TABLET: 7.5; 325 TABLET ORAL at 03:05

## 2018-05-09 RX ADMIN — INSULIN ASPART 1 UNITS: 100 INJECTION, SOLUTION INTRAVENOUS; SUBCUTANEOUS at 10:05

## 2018-05-09 RX ADMIN — DIPHENHYDRAMINE HYDROCHLORIDE 50 MG: 50 CAPSULE ORAL at 05:05

## 2018-05-09 RX ADMIN — Medication 12.5 MG: at 09:05

## 2018-05-09 RX ADMIN — DIPHENHYDRAMINE HYDROCHLORIDE 50 MG: 50 CAPSULE ORAL at 10:05

## 2018-05-09 RX ADMIN — DIPHENHYDRAMINE HYDROCHLORIDE 50 MG: 50 CAPSULE ORAL at 12:05

## 2018-05-09 RX ADMIN — ASPIRIN 81 MG: 81 TABLET, COATED ORAL at 09:05

## 2018-05-09 RX ADMIN — CITALOPRAM HYDROBROMIDE 10 MG: 10 TABLET ORAL at 09:05

## 2018-05-09 RX ADMIN — GABAPENTIN 400 MG: 400 CAPSULE ORAL at 10:05

## 2018-05-09 RX ADMIN — VITAMIN D, TAB 1000IU (100/BT) 1000 UNITS: 25 TAB at 09:05

## 2018-05-09 RX ADMIN — PREDNISONE 50 MG: 20 TABLET ORAL at 05:05

## 2018-05-09 RX ADMIN — OXYCODONE HYDROCHLORIDE AND ACETAMINOPHEN 1 TABLET: 7.5; 325 TABLET ORAL at 10:05

## 2018-05-09 RX ADMIN — PANTOPRAZOLE SODIUM 40 MG: 40 TABLET, DELAYED RELEASE ORAL at 09:05

## 2018-05-09 RX ADMIN — OXYCODONE HYDROCHLORIDE AND ACETAMINOPHEN 1 TABLET: 7.5; 325 TABLET ORAL at 09:05

## 2018-05-09 RX ADMIN — MAGNESIUM SULFATE IN WATER 2 G: 40 INJECTION, SOLUTION INTRAVENOUS at 09:05

## 2018-05-09 RX ADMIN — GABAPENTIN 800 MG: 400 CAPSULE ORAL at 09:05

## 2018-05-09 RX ADMIN — CLOPIDOGREL 75 MG: 75 TABLET, FILM COATED ORAL at 09:05

## 2018-05-09 NOTE — PROGRESS NOTES
[facility] Hospital Medicine  Progress Note    Patient Name: Elías Muñoz  MRN: [id number]  Patient Class: IP- Inpatient   Admission Date: 5/4/2018  Length of Stay: 5 days  Attending Physician: Terry Cotter MD  Primary Care Provider: Calin Casas MD    [facility] Medicine Team: [facility] MED J Karen Mejia NP    Subjective:     Principal Problem:Coronary artery disease involving native coronary artery of native heart without angina pectoris    HPI:  Ms. Muñoz is a 78 year old female with past medical history of HTN, HLD, DM2, paroxysmal afib, CAD, lumbar DJD with chronic low back pain, fall risk due to unsteady gait, and breast cancer s/p left mastectomy who is a [employer]. She was admitted to [facility] [place] 04[place]25 from clinic with asymptomatic hypotension and bradycardia. Noted from clinic her BP was 60/30 but she felt fine. At time of admission to [facility] EKG noted sinus bradycardia with HR 42 and first degree ABV, metoprolol was held due to bradycardia, she denied chest pain or shortness of breath, and troponin negative. She then developed AFib with RVR on 04/27 and BB increased with initiation of NOAC pradaxa. Cardiology evaluated at OSH due to bradycardia and then afib w RVR. She then had elective diagnostic LHC on 04/30 which revealed multivessel disease including left main artery, LAD, CFx and RCA (LM 95%, LAD 9-100%, Lcx, RCA 75%); normal LVSF.      Case was discussed with Dr. Germain from [facility] and deemed patient is not a CABG candidate due to anatomy. Dr. Germain then reviewed cath films with Dr. Jose Tellez from Interventional cardiology and suggested transfer to [facility] for high risk PCI. At time of transfer she had heparin infusion since LHC procedure w/o evidence of bleeding.  She has iodine allergy and received prednisone prior to recent C w/o any adverse reaction. She is a [employer] and refusing any blood product transfusion.    The patient was  admitted to the Hospital Medicine Service for further evaluation and management.     Hospital Course:  Ms. Guadalupe was transferred from [facility] on 5/4 for high risk PCI. While waiting for procedure she was noted to be hypertensive; metoprolol transitioned to coreg, however noted to have hypoTN with resultant rise in SCr despite 1 liter IV fluid resuscitation. BB/lisinopril held, 5/7 cath delayed. Episodes of chest pain relieved with SL NTG overnight 5/7, no acute EKG changes. Her SCr has returned to WNL, iodine allergy prep initiated with plans for LHC 5/9.    Disposition plans: Home with sister post procedure, will need cardiac rehab referral and outpt follow up with primary Cardiologist.     Interval History: Resting in bed, family at bedside. She is anticipatory of [facility] this afternoon. She denies chest pain, palpitations, or shortness of breath. Denies any acute events or distress overnight.      Review of Systems   Constitutional: Negative for activity change, appetite change and fatigue.   HENT: Negative for congestion, sore throat and trouble swallowing.    Respiratory: Negative for cough and shortness of breath.    Cardiovascular: Negative for chest pain, palpitations and leg swelling.   Gastrointestinal: Negative for abdominal pain, constipation, diarrhea, nausea and vomiting.   Genitourinary: Negative for decreased urine volume, difficulty urinating and hematuria.   Musculoskeletal: Positive for back pain (chronic). Negative for myalgias.   Neurological: Negative for dizziness, weakness, light-headedness, numbness and headaches.   Hematological: Does not bruise/bleed easily.   Psychiatric/Behavioral: Negative for agitation, decreased concentration and sleep disturbance. The patient is not nervous/anxious.      Objective:     Vital Signs (Most Recent):  Temp: 98.8 °F (37.1 °C) (05/09/18 1128)  Pulse: 62 (05/09/18 1144)  Resp: 18 (05/09/18 0929)  BP: (!) 155/71 (05/09/18 1128)  SpO2: 95  % (05/09/18 1128) Vital Signs (24h Range):  Temp:  [96.7 °F (35.9 °C)-98.8 °F (37.1 °C)] 98.8 °F (37.1 °C)  Pulse:  [57-73] 62  Resp:  [16-19] 18  SpO2:  [95 %-96 %] 95 %  BP: ()/(48-71) 155/71     Weight: 75.1 kg (165 lb 9.1 oz)  Body mass index is 25.17 kg/m².    Intake/Output Summary (Last 24 hours) at 05/09/18 1423  Last data filed at 05/09/18 1246   Gross per 24 hour   Intake            586.8 ml   Output             3051 ml   Net          -2464.2 ml      Physical Exam   Constitutional: She is oriented to person, place, and time. She appears well-developed and well-nourished. No distress.   HENT:   Head: Normocephalic and atraumatic.   Right Ear: External ear normal.   Left Ear: External ear normal.   Nose: Nose normal.   Eyes: Conjunctivae and EOM are normal.   Neck: Normal range of motion. Neck supple. No JVD present.   Cardiovascular: Normal rate, regular rhythm, normal heart sounds and intact distal pulses.    No murmur heard.  Pulmonary/Chest: Effort normal and breath sounds normal. No respiratory distress. She has no wheezes. She has no rales.   Abdominal: Soft. Bowel sounds are normal. She exhibits no distension and no mass. There is no tenderness. There is no guarding.   Musculoskeletal: Normal range of motion. She exhibits no edema.   Neurological: She is alert and oriented to person, place, and time. No cranial nerve deficit or sensory deficit. Coordination normal.   Skin: Skin is warm and dry. Capillary refill takes 2 to 3 seconds. No rash noted. She is not diaphoretic. No erythema.   Psychiatric: She has a normal mood and affect. Her behavior is normal. Judgment and thought content normal.   Nursing note and vitals reviewed.    Significant Labs:   CBC:     Recent Labs  Lab 05/09/18  0530   WBC 17.35*  17.35*   HGB 11.0*  11.0*   HCT 36.2*  36.2*     308     CMP:     Recent Labs  Lab 05/07/18  2108 05/08/18  0518 05/09/18  0530    140 138  138   K 5.2* 4.4 5.3*  5.3*     110 107  107   CO2 23 22* 22*  22*   * 137* 224*  224*   BUN 23 23 27*  27*   CREATININE 1.4 1.0 1.1  1.1   CALCIUM 9.6 9.4 9.7  9.7   ANIONGAP 7* 8 9  9   EGFRNONAA 36.5* 54.9* 48.9*  48.9*     Significant Imaging: I have reviewed all pertinent imaging results/findings within the past 24 hours.     2d cfd echo: 5/4/18   1 - Upper limit of normal left ventricular enlargement.     2 - Normal left ventricular systolic function (EF 65-70%).     3 - Severe left atrial enlargement.     4 - No wall motion abnormalities.     5 - Indeterminate LV diastolic function.     6 - Normal right ventricular systolic function .     7 - Trivial to mild mitral regurgitation.     8 - Trivial to mild tricuspid regurgitation.     9 - The estimated PA systolic pressure is 28 mmHg.     Assessment/Plan:      * Coronary artery disease involving native coronary artery of native heart without angina pectoris    -transfer from [facility] for high risk PCI  -continue heparin gtt (indcations for CAD and Afib with RVR)   -on GDMT, BP was elevated at admission, transitioned metoprolol to coreg however noted to become hypoTN and result in ADARSH   -BB and lisinopril held and given 1 liter IVF for resuscitation   -BP now WNL and SCr return WNL   -resumed low dose BB   -continue holding lisinopril >>> resume post procedure   -plans for [facility] in today>>> iodine allergy prep completed   -transient rise in WBC after second prep w/ steroids >>> procalc negative, continue to monitor CBC  -continue tele monitoring  -EKG and SL NTG PRN chest pain  -will need outpt follow up with Cardiology and referral to cardiac rehab         ADARSH (acute kidney injury)    -due to hypotension  -peak SCr 1.5   -now back to WNL  -resumed BB  -hold ACEi for now >> resume when appropriate  -continue to monitor        Paroxysmal A-fib    -episode OSH  -currently SR/ SB  -on heparin gtt to bridge to PCI then she will need to resume DOAC   -started on pradaxa at OSH >> can  consider switching to eliquis or xarelto  -continue low dose BB  -continue tele monitoring  -outpt follow up with Cardiology         Essential hypertension    -see above for details  -BP WNL to slightly elevated  -continue low dose BB  -resume lisinopril post procedure  -continue to monitor         Other hyperlipidemia    -transitioned to high intensity statin  -lipid panel: Chol 113, , HDL 40, LDL 47.4  -cardiac diet in house        Type 2 diabetes mellitus, without long-term current use of insulin    -oral hypoglycemics held  -HgA1C 5.5  -SSI with AC/HS POCT  -cardiac/ADA diet in house         Chronic low back pain    -chronic, no acute issues  -continue home tylenol and zanaflex         Refusal of blood transfusions as patient is [employer]    -no acute issues  -CBC stable, monitor over hospital course        Neuropathy    -no acute issues  -continue home gabapentin           VTE Risk Mitigation         Ordered     heparin 25,000 units in dextrose 5% 250 mL (100 units/mL) infusion  Continuous      05/04/18 0457     heparin 25,000 units in dextrose 5% 250 mL (100 units/mL) bolus from bag; ADDITIONAL PRN BOLUS  As needed (PRN)      05/04/18 0457     heparin 25,000 units in dextrose 5% 250 mL (100 units/mL) bolus from bag; ADDITIONAL PRN BOLUS  As needed (PRN)      05/04/18 0457     IP VTE HIGH RISK PATIENT  Once      05/04/18 0453     Place sequential compression device  Until discontinued      05/04/18 0453          Karen Mejia, DNP, AG-ACNP, BC  Department of Hospital Medicine  [facility]  Pager [phone]  [facility] [id number]

## 2018-05-09 NOTE — SUBJECTIVE & OBJECTIVE
Interval History: Resting in bed, family at bedside. She is anticipatory of [facility] this afternoon. She denies chest pain, palpitations, or shortness of breath. Denies any acute events or distress overnight.      Review of Systems   Constitutional: Negative for activity change, appetite change and fatigue.   HENT: Negative for congestion, sore throat and trouble swallowing.    Respiratory: Negative for cough and shortness of breath.    Cardiovascular: Negative for chest pain, palpitations and leg swelling.   Gastrointestinal: Negative for abdominal pain, constipation, diarrhea, nausea and vomiting.   Genitourinary: Negative for decreased urine volume, difficulty urinating and hematuria.   Musculoskeletal: Positive for back pain (chronic). Negative for myalgias.   Neurological: Negative for dizziness, weakness, light-headedness, numbness and headaches.   Hematological: Does not bruise/bleed easily.   Psychiatric/Behavioral: Negative for agitation, decreased concentration and sleep disturbance. The patient is not nervous/anxious.      Objective:     Vital Signs (Most Recent):  Temp: 98.8 °F (37.1 °C) (05/09/18 1128)  Pulse: 62 (05/09/18 1144)  Resp: 18 (05/09/18 0929)  BP: (!) 155/71 (05/09/18 1128)  SpO2: 95 % (05/09/18 1128) Vital Signs (24h Range):  Temp:  [96.7 °F (35.9 °C)-98.8 °F (37.1 °C)] 98.8 °F (37.1 °C)  Pulse:  [57-73] 62  Resp:  [16-19] 18  SpO2:  [95 %-96 %] 95 %  BP: ()/(48-71) 155/71     Weight: 75.1 kg (165 lb 9.1 oz)  Body mass index is 25.17 kg/m².    Intake/Output Summary (Last 24 hours) at 05/09/18 1423  Last data filed at 05/09/18 1246   Gross per 24 hour   Intake            586.8 ml   Output             3051 ml   Net          -2464.2 ml      Physical Exam   Constitutional: She is oriented to person, place, and time. She appears well-developed and well-nourished. No distress.   HENT:   Head: Normocephalic and atraumatic.   Right Ear: External ear normal.   Left Ear: External ear normal.   Nose:  Nose normal.   Eyes: Conjunctivae and EOM are normal.   Neck: Normal range of motion. Neck supple. No JVD present.   Cardiovascular: Normal rate, regular rhythm, normal heart sounds and intact distal pulses.    No murmur heard.  Pulmonary/Chest: Effort normal and breath sounds normal. No respiratory distress. She has no wheezes. She has no rales.   Abdominal: Soft. Bowel sounds are normal. She exhibits no distension and no mass. There is no tenderness. There is no guarding.   Musculoskeletal: Normal range of motion. She exhibits no edema.   Neurological: She is alert and oriented to person, place, and time. No cranial nerve deficit or sensory deficit. Coordination normal.   Skin: Skin is warm and dry. Capillary refill takes 2 to 3 seconds. No rash noted. She is not diaphoretic. No erythema.   Psychiatric: She has a normal mood and affect. Her behavior is normal. Judgment and thought content normal.   Nursing note and vitals reviewed.    Significant Labs:   CBC:     Recent Labs  Lab 05/09/18  0530   WBC 17.35*  17.35*   HGB 11.0*  11.0*   HCT 36.2*  36.2*     308     CMP:     Recent Labs  Lab 05/07/18  2108 05/08/18  0518 05/09/18  0530    140 138  138   K 5.2* 4.4 5.3*  5.3*    110 107  107   CO2 23 22* 22*  22*   * 137* 224*  224*   BUN 23 23 27*  27*   CREATININE 1.4 1.0 1.1  1.1   CALCIUM 9.6 9.4 9.7  9.7   ANIONGAP 7* 8 9  9   EGFRNONAA 36.5* 54.9* 48.9*  48.9*     Significant Imaging: I have reviewed all pertinent imaging results/findings within the past 24 hours.     2d cfd echo: 5/4/18   1 - Upper limit of normal left ventricular enlargement.     2 - Normal left ventricular systolic function (EF 65-70%).     3 - Severe left atrial enlargement.     4 - No wall motion abnormalities.     5 - Indeterminate LV diastolic function.     6 - Normal right ventricular systolic function .     7 - Trivial to mild mitral regurgitation.     8 - Trivial to mild tricuspid regurgitation.      9 - The estimated PA systolic pressure is 28 mmHg.

## 2018-05-09 NOTE — PLAN OF CARE
Problem: Patient Care Overview  Goal: Plan of Care Review  Outcome: Ongoing (interventions implemented as appropriate)  Pt AAOx4. Pt free from falls. Pt wears non slip socks when ambulating. Pt bed low and locked position. Pt afebrile. Pt IV site without redness or edema. Pt has denied any pain or discomfort this shift. Heparin gtt infusing and is therapeutic next anti xa in am. Pt NPO for L heart cath in am.

## 2018-05-09 NOTE — PLAN OF CARE
Problem: Patient Care Overview  Goal: Plan of Care Review  Outcome: Ongoing (interventions implemented as appropriate)  Plan of care reviewed with pt. Pt remained free of falls, trauma, and injury. VSS. Pt went for [facility], a stent was placed to the left main. The left femoral artery was use. Incision remained dry clean and intact. Fluids are running for the next two hours. Will continue to monitor.

## 2018-05-09 NOTE — ASSESSMENT & PLAN NOTE
-transfer from [facility] for high risk PCI  -continue heparin gtt (indcations for CAD and Afib with RVR)   -on GDMT, BP was elevated at admission, transitioned metoprolol to coreg however noted to become hypoTN and result in ADARSH   -BB and lisinopril held and given 1 liter IVF for resuscitation   -BP now WNL and SCr return WNL   -resumed low dose BB   -continue holding lisinopril >>> resume post procedure   -plans for [facility] in today>>> iodine allergy prep completed   -transient rise in WBC after second prep w/ steroids >>> procalc negative, continue to monitor CBC  -continue tele monitoring  -EKG and SL NTG PRN chest pain  -will need outpt follow up with Cardiology and referral to cardiac rehab

## 2018-05-09 NOTE — BRIEF OP NOTE
"    Post Cath Note  Referring Physician: Terry Cotter MD  Procedure: Left heart cath (Left)       Access: Right CFA    95% LM Stenosis  60% Proximal LAD Stenosis   Distal LAD    See full report for further details    Intervention:     3.5 x 8 mm JACQUES to LM    Closure device: Perclosure    Post Cath Exam:   BP (!) 155/71 (BP Location: Right arm, Patient Position: Lying)   Pulse 62   Temp 98.8 °F (37.1 °C) (Oral)   Resp 18   Ht 5' 8" (1.727 m)   Wt 75.1 kg (165 lb 9.1 oz)   LMP 11/02/2015 (Exact Date)   SpO2 95%   Breastfeeding? No   BMI 25.17 kg/m²   No unusual pain, hematoma, thrill or bruit at vascular access site.  Distal pulse present without signs of ischemia.    Recommendations:   - Routine post-cath care  - IVF at 3 cc/kg/hr for 4 hrs  - Continue medical management  - Risk factor reduction  - Plavix for at least 1 year and ASA 81 mg indefinitely  - Check PRU  - Follow-up in 1 month in clinic with Dr. Christie to consider LAD atherectomy and stenting    Signed:  Wilfrid Reid MD  Cardiology Fellow, PGY-5  5/9/2018 4:14 PM    "

## 2018-05-09 NOTE — PLAN OF CARE
Cardiology follow up scheduled with pt Cardiologist Dr Pollack at [facility] in [place]  Fax # [phone]

## 2018-05-10 ENCOUNTER — DOCUMENTATION ONLY (OUTPATIENT)
Dept: CARDIAC REHAB | Facility: CLINIC | Age: 78
End: 2018-05-10

## 2018-05-10 VITALS
BODY MASS INDEX: 28.15 KG/M2 | DIASTOLIC BLOOD PRESSURE: 64 MMHG | SYSTOLIC BLOOD PRESSURE: 139 MMHG | TEMPERATURE: 98 F | WEIGHT: 185.75 LBS | RESPIRATION RATE: 20 BRPM | HEART RATE: 61 BPM | HEIGHT: 68 IN | OXYGEN SATURATION: 95 %

## 2018-05-10 DIAGNOSIS — Z98.61 POSTSURGICAL PERCUTANEOUS TRANSLUMINAL CORONARY ANGIOPLASTY STATUS: Primary | ICD-10-CM

## 2018-05-10 PROBLEM — N17.9 AKI (ACUTE KIDNEY INJURY): Status: RESOLVED | Noted: 2018-03-13 | Resolved: 2018-05-10

## 2018-05-10 LAB
ANION GAP SERPL CALC-SCNC: 10 MMOL/L
ANISOCYTOSIS BLD QL SMEAR: SLIGHT
BASOPHILS # BLD AUTO: 0.02 K/UL
BASOPHILS NFR BLD: 0.1 %
BUN SERPL-MCNC: 26 MG/DL
CALCIUM SERPL-MCNC: 9.4 MG/DL
CHLORIDE SERPL-SCNC: 108 MMOL/L
CO2 SERPL-SCNC: 21 MMOL/L
CORONARY STENOSIS: ABNORMAL
CORONARY STENT: YES
CREAT SERPL-MCNC: 1 MG/DL
DIFFERENTIAL METHOD: ABNORMAL
EOSINOPHIL # BLD AUTO: 0 K/UL
EOSINOPHIL NFR BLD: 0.1 %
ERYTHROCYTE [DISTWIDTH] IN BLOOD BY AUTOMATED COUNT: 14.9 %
EST. GFR  (AFRICAN AMERICAN): >60 ML/MIN/1.73 M^2
EST. GFR  (NON AFRICAN AMERICAN): 54.9 ML/MIN/1.73 M^2
GLUCOSE SERPL-MCNC: 136 MG/DL
HCT VFR BLD AUTO: 32.2 %
HGB BLD-MCNC: 9.8 G/DL
HYPOCHROMIA BLD QL SMEAR: ABNORMAL
IMM GRANULOCYTES # BLD AUTO: 0.28 K/UL
IMM GRANULOCYTES NFR BLD AUTO: 1.5 %
LYMPHOCYTES # BLD AUTO: 4.2 K/UL
LYMPHOCYTES NFR BLD: 23.2 %
MAGNESIUM SERPL-MCNC: 1.8 MG/DL
MCH RBC QN AUTO: 28.8 PG
MCHC RBC AUTO-ENTMCNC: 30.4 G/DL
MCV RBC AUTO: 95 FL
MONOCYTES # BLD AUTO: 2.7 K/UL
MONOCYTES NFR BLD: 14.7 %
NEUTROPHILS # BLD AUTO: 11 K/UL
NEUTROPHILS NFR BLD: 60.4 %
NRBC BLD-RTO: 0 /100 WBC
OVALOCYTES BLD QL SMEAR: ABNORMAL
PLATELET # BLD AUTO: 281 K/UL
PMV BLD AUTO: 11.1 FL
POCT GLUCOSE: 122 MG/DL (ref 70–110)
POIKILOCYTOSIS BLD QL SMEAR: SLIGHT
POLYCHROMASIA BLD QL SMEAR: ABNORMAL
POTASSIUM SERPL-SCNC: 5 MMOL/L
RBC # BLD AUTO: 3.4 M/UL
SODIUM SERPL-SCNC: 139 MMOL/L
WBC # BLD AUTO: 18.27 K/UL

## 2018-05-10 PROCEDURE — 25000003 PHARM REV CODE 250: Performed by: NURSE PRACTITIONER

## 2018-05-10 PROCEDURE — 63600175 PHARM REV CODE 636 W HCPCS: Performed by: NURSE PRACTITIONER

## 2018-05-10 PROCEDURE — 99239 HOSP IP/OBS DSCHRG MGMT >30: CPT | Mod: ,,, | Performed by: NURSE PRACTITIONER

## 2018-05-10 PROCEDURE — 83735 ASSAY OF MAGNESIUM: CPT

## 2018-05-10 PROCEDURE — 25000003 PHARM REV CODE 250: Performed by: HOSPITALIST

## 2018-05-10 PROCEDURE — 36415 COLL VENOUS BLD VENIPUNCTURE: CPT

## 2018-05-10 PROCEDURE — 25000003 PHARM REV CODE 250: Performed by: STUDENT IN AN ORGANIZED HEALTH CARE EDUCATION/TRAINING PROGRAM

## 2018-05-10 PROCEDURE — 80048 BASIC METABOLIC PNL TOTAL CA: CPT

## 2018-05-10 PROCEDURE — 85025 COMPLETE CBC W/AUTO DIFF WBC: CPT

## 2018-05-10 RX ORDER — HEPARIN 100 UNIT/ML
100 SYRINGE INTRAVENOUS EVERY 8 HOURS PRN
Status: DISCONTINUED | OUTPATIENT
Start: 2018-05-10 | End: 2018-05-10 | Stop reason: HOSPADM

## 2018-05-10 RX ORDER — METOPROLOL SUCCINATE 25 MG/1
25 TABLET, EXTENDED RELEASE ORAL DAILY
Status: DISCONTINUED | OUTPATIENT
Start: 2018-05-10 | End: 2018-05-10 | Stop reason: HOSPADM

## 2018-05-10 RX ORDER — CLOPIDOGREL BISULFATE 75 MG/1
75 TABLET ORAL DAILY
Qty: 30 TABLET | Refills: 11 | Status: SHIPPED | OUTPATIENT
Start: 2018-05-11 | End: 2020-01-24 | Stop reason: SDUPTHER

## 2018-05-10 RX ORDER — DIPHENHYDRAMINE HCL 50 MG
50 CAPSULE ORAL ONCE
Status: COMPLETED | OUTPATIENT
Start: 2018-05-10 | End: 2018-05-10

## 2018-05-10 RX ORDER — METOPROLOL SUCCINATE 25 MG/1
25 TABLET, EXTENDED RELEASE ORAL DAILY
Qty: 30 TABLET | Refills: 11 | Status: ON HOLD | OUTPATIENT
Start: 2018-05-11 | End: 2018-06-26 | Stop reason: HOSPADM

## 2018-05-10 RX ORDER — NITROGLYCERIN 0.4 MG/1
0.4 TABLET SUBLINGUAL EVERY 5 MIN PRN
Qty: 25 TABLET | Refills: 3 | Status: SHIPPED | OUTPATIENT
Start: 2018-05-10 | End: 2019-09-18 | Stop reason: SDUPTHER

## 2018-05-10 RX ORDER — ATORVASTATIN CALCIUM 40 MG/1
40 TABLET, FILM COATED ORAL NIGHTLY
Qty: 90 TABLET | Refills: 3 | Status: SHIPPED | OUTPATIENT
Start: 2018-05-10 | End: 2020-01-24 | Stop reason: SDUPTHER

## 2018-05-10 RX ORDER — GABAPENTIN 400 MG/1
400 CAPSULE ORAL 3 TIMES DAILY
Qty: 90 CAPSULE | Refills: 11 | Status: SHIPPED | OUTPATIENT
Start: 2018-05-10 | End: 2019-04-09 | Stop reason: SDUPTHER

## 2018-05-10 RX ORDER — FAMOTIDINE 20 MG/1
40 TABLET, FILM COATED ORAL ONCE
Status: COMPLETED | OUTPATIENT
Start: 2018-05-10 | End: 2018-05-10

## 2018-05-10 RX ORDER — HEPARIN SODIUM,PORCINE/PF 10 UNIT/ML
10 SYRINGE (ML) INTRAVENOUS EVERY 8 HOURS PRN
Status: DISCONTINUED | OUTPATIENT
Start: 2018-05-10 | End: 2018-05-10

## 2018-05-10 RX ORDER — MAGNESIUM SULFATE HEPTAHYDRATE 40 MG/ML
2 INJECTION, SOLUTION INTRAVENOUS ONCE
Status: COMPLETED | OUTPATIENT
Start: 2018-05-10 | End: 2018-05-10

## 2018-05-10 RX ADMIN — CETIRIZINE HYDROCHLORIDE 10 MG: 5 TABLET, FILM COATED ORAL at 09:05

## 2018-05-10 RX ADMIN — CLOPIDOGREL 75 MG: 75 TABLET, FILM COATED ORAL at 09:05

## 2018-05-10 RX ADMIN — ASPIRIN 81 MG: 81 TABLET, COATED ORAL at 09:05

## 2018-05-10 RX ADMIN — HEPARIN 100 UNITS: 100 SYRINGE at 02:05

## 2018-05-10 RX ADMIN — VITAMIN D, TAB 1000IU (100/BT) 1000 UNITS: 25 TAB at 09:05

## 2018-05-10 RX ADMIN — CITALOPRAM HYDROBROMIDE 10 MG: 10 TABLET ORAL at 09:05

## 2018-05-10 RX ADMIN — PANTOPRAZOLE SODIUM 40 MG: 40 TABLET, DELAYED RELEASE ORAL at 09:05

## 2018-05-10 RX ADMIN — THERA TABS 1 TABLET: TAB at 09:05

## 2018-05-10 RX ADMIN — METOPROLOL SUCCINATE 25 MG: 25 TABLET, EXTENDED RELEASE ORAL at 09:05

## 2018-05-10 RX ADMIN — GABAPENTIN 400 MG: 400 CAPSULE ORAL at 09:05

## 2018-05-10 RX ADMIN — MAGNESIUM SULFATE IN WATER 2 G: 40 INJECTION, SOLUTION INTRAVENOUS at 11:05

## 2018-05-10 RX ADMIN — FAMOTIDINE 40 MG: 20 TABLET, FILM COATED ORAL at 12:05

## 2018-05-10 RX ADMIN — APIXABAN 5 MG: 5 TABLET, FILM COATED ORAL at 09:05

## 2018-05-10 RX ADMIN — OXYCODONE HYDROCHLORIDE AND ACETAMINOPHEN 1 TABLET: 7.5; 325 TABLET ORAL at 05:05

## 2018-05-10 RX ADMIN — DIPHENHYDRAMINE HYDROCHLORIDE 50 MG: 50 CAPSULE ORAL at 12:05

## 2018-05-10 NOTE — ASSESSMENT & PLAN NOTE
-episode at OSH  -currently SR/ SB  -heparin gtt to bridge to PCI then resumed OAC with apixaban  -continue low dose BB  -outpt follow up with Cardiology as above

## 2018-05-10 NOTE — ASSESSMENT & PLAN NOTE
-transfer from [facility] for high risk PCI  -continued heparin gtt (indcations for CAD and Afib with RVR) over hospital course >> transitioned to OAC with apixaban prior to discharge   -on GDMT, BP was elevated at admission, transitioned metoprolol to coreg however noted to become hypoTN and result in ADARSH   -BB and lisinopril held and given 1 liter IVF for resuscitation   -BP now WNL and SCr return WNL   -resumed low dose BB   -lisinopril resumed at discharge >> repeat CMP in one week as outpt   -[facility] 5/9 with findings of 95% LM stenosis, 60% Proximal LAD stenosis, and  Distal LAD; received JACQUES to LM. Follow-up in 1 month in clinic with Dr. Christie to consider LAD atherectomy and stenting   -transient rise in WBC after second prep w/ steroids >>> procalc negative   -repeat CBC in one week as outpt   -no events on tele  -NTG PRN chest pain  -cardiac rehab referral sent  -outpt follow ups with primary Cardiologist and Dr. Christie scheduled

## 2018-05-10 NOTE — DISCHARGE SUMMARY
[facility] Hospital Medicine  Discharge Summary      Patient Name: Elías Muñoz  MRN: [id number]  Admission Date: 5/4/2018  Hospital Length of Stay: 6 days  Discharge Date and Time:  05/10/2018 2:47 PM  Attending Physician: Terry Cotter MD   Discharging Provider: Karen Mejia NP  Primary Care Provider: Calin Casas MD  Hospital Medicine Team: [facility]OSP MED  Karen Mejia NP    HPI:   Ms. Muñoz is a 78 year old female with past medical history of HTN, HLD, DM2, paroxysmal afib, CAD, lumbar DJD with chronic low back pain, fall risk due to unsteady gait, and breast cancer s/p left mastectomy who is a Protestant. She was admitted to [facility] on 04/25 from clinic with asymptomatic hypotension and bradycardia. Noted from clinic her BP was 60/30 but she felt fine. At time of admission to [facility] EKG noted sinus bradycardia with HR 42 and first degree ABV, metoprolol was held due to bradycardia, she denied chest pain or shortness of breath, and troponin negative. She then developed AFib with RVR on 04/27 and BB increased with initiation of NOAC pradaxa. Cardiology evaluated at OSH due to bradycardia and then afib w RVR. She then had elective diagnostic LHC on 04/30 which revealed multivessel disease including left main artery, LAD, CFx and RCA (LM 95%, LAD 9-100%, Lcx, RCA 75%); normal LVSF.      Case was discussed with Dr. Germain from [facility] and deemed patient is not a CABG candidate due to anatomy. Dr. Germain then reviewed cath films with Dr. Jose Tellez from Interventional cardiology and suggested transfer to [facility] for high risk PCI. At time of transfer she had heparin infusion since LHC procedure w/o evidence of bleeding.  She has iodine allergy and received prednisone prior to recent LHC w/o any adverse reaction. She is a Protestant and refusing any blood product transfusion.    The patient was admitted to the Hospital Medicine Service for further evaluation  and management.     Procedure(s) (LRB):  Left heart cath (Left)      Hospital Course:   Ms. Guadalupe was transferred from [facility] on 5/4 for high risk PCI. While waiting for procedure she was noted to be hypertensive; metoprolol transitioned to coreg, however noted to have hypoTN with resultant rise in SCr despite 1 liter IV fluid resuscitation. BB/lisinopril held, 5/7 cath delayed. Episodes of chest pain relieved with SL NTG overnight 5/7, no acute EKG changes. Her SCr has returned to WNL, iodine allergy prep completed and underwent LHC 5/9 with findings of 95% LM stenosis, 60% Proximal LAD stenosis, and  Distal LAD; received JACQUES to LM. Follow-up in 1 month in clinic with Dr. Christie to consider LAD atherectomy and stenting    Disposition: Home with family, will need cardiac rehab referral sent, Interventional Cardiology follow up scheduled, outpt follow up with primary Cardiologist scheduled.     Consults:   Consults         Status Ordering Provider     Inpatient consult to Interventional Cardiology  Once     Provider:  (Not yet assigned)    Completed HAKAN JOHN        * Coronary artery disease involving native coronary artery of native heart without angina pectoris    -[place] [facility] [place]  -continued heparin gtt (indcations for CAD and Afib with RVR) over hospital course >> transitioned to OAC with apixaban prior to discharge   -on GDMT, BP was elevated at admission, transitioned metoprolol to coreg however noted to become hypoTN and result in ADARSH   -BB and lisinopril held and given 1 liter IVF for resuscitation   -BP now WNL and SCr return WNL   -resumed low dose BB   -lisinopril resumed at discharge >> repeat CMP in one week as outpt   -LHC 5/9 with findings of 95% LM stenosis, 60% Proximal LAD stenosis, and  Distal LAD; received JACQUES to LM. Follow-up in 1 month in clinic with Dr. Christie to consider LAD atherectomy and stenting   -transient rise in WBC after  second prep w/ steroids >>> procalc negative   -repeat CBC in one week as outpt   -no events on tele  -NTG PRN chest pain  -cardiac rehab referral sent  -outpt follow ups with primary Cardiologist and Dr. Christie scheduled         ADARSH (acute kidney injury)-resolved as of 5/10/2018    -due to hypotension  -peak SCr 1.5   -now back to WNL  -resumed BB  -hold ACEi for now >> resume when appropriate  -continue to monitor        Paroxysmal A-fib    -episode at OSH  -currently SR/ SB  -heparin gtt to bridge to PCI then resumed OAC with apixaban  -continue low dose BB  -outpt follow up with Cardiology as above         Essential hypertension    -see above for details  -BP WNL to slightly elevated  -continue low dose BB  -resume lisinopril at discharge        Other hyperlipidemia    -transitioned to high intensity statin  -lipid panel: Chol 113, , HDL 40, LDL 47.4  -cardiac/ADA diet encouraged at home        Type 2 diabetes mellitus, without long-term current use of insulin    -no home therapies  -HgA1C 5.5  -tolerated SSI in house  -cardiac/ADA diet encouraged at home         Chronic low back pain    -chronic, no acute issues  -continue home tylenol and zanaflex         Refusal of blood transfusions as patient is Lutheran    -no acute issues  -CBC stable        Neuropathy    -no acute issues  -continue home gabapentin >>> dose adjusted per renal function by pharmD          Final Active Diagnoses:    Diagnosis Date Noted POA    PRINCIPAL PROBLEM:  Coronary artery disease involving native coronary artery of native heart without angina pectoris [I25.10] 04/30/2018 Yes    Paroxysmal A-fib [I48.0] 05/04/2018 Yes    Essential hypertension [I10] 01/02/2015 Yes    Other hyperlipidemia [E78.4]  Yes    Type 2 diabetes mellitus, without long-term current use of insulin [E11.9]  Yes    Chronic low back pain [M54.5, G89.29] 12/12/2014 Yes     Chronic    Refusal of blood transfusions as patient is Lutheran  [Z53.1] 05/02/2018 Not Applicable    Neuropathy [G62.9] 05/01/2015 Yes      Problems Resolved During this Admission:    Diagnosis Date Noted Date Resolved POA    ADARSH (acute kidney injury) [N17.9] 03/13/2018 05/10/2018 Yes     Discharged Condition: good    Disposition: Home or Self Care    Follow Up:  Follow-up Information     Abhishek Pollack MD On 5/28/2018.    Specialty:  Cardiology  Why:  2:20 Cardiology follow up  Contact information:  [address] [facility] [place] [address]  [phone]             Chai Christie MD On 6/12/2018.    Specialties:  Cardiology, INTERVENTIONAL CARDIOLOGY  Why:  at 8:40 AM to discuss further needs for angiogram and stenting   Contact information:  [address]  [facility] [address]  [phone]                 Patient Instructions:     CBC auto differential   Standing Status: Future  Standing Exp. Date: 07/09/19     Comprehensive metabolic panel   Standing Status: Future  Standing Exp. Date: 07/09/19     Diet Cardiac     Diet diabetic     Activity as tolerated     Notify your health care provider if you experience any of the following:  temperature >100.4     Notify your health care provider if you experience any of the following:  persistent nausea and vomiting or diarrhea     Notify your health care provider if you experience any of the following:  severe uncontrolled pain     Notify your health care provider if you experience any of the following:  difficulty breathing or increased cough     Notify your health care provider if you experience any of the following:  redness, tenderness, or signs of infection (pain, swelling, redness, odor or green/yellow discharge around incision site)     Notify your health care provider if you experience any of the following:  severe persistent headache     Notify your health care provider if you experience any of the following:  persistent dizziness, light-headedness, or visual disturbances     Notify your health care provider if you  experience any of the following:  increased confusion or weakness     Review of Systems   Constitutional: Negative for activity change, appetite change and fatigue.   HENT: Negative for congestion, sore throat and trouble swallowing.    Respiratory: Negative for cough and shortness of breath.    Cardiovascular: Negative for chest pain, palpitations and leg swelling.   Gastrointestinal: Negative for abdominal pain, constipation, diarrhea, nausea and vomiting.   Genitourinary: Negative for decreased urine volume, difficulty urinating and hematuria.   Musculoskeletal: Positive for back pain (chronic). Negative for myalgias.   Neurological: Negative for dizziness, weakness, light-headedness, numbness and headaches.   Hematological: Does not bruise/bleed easily.   Psychiatric/Behavioral: Negative for agitation, decreased concentration and sleep disturbance. The patient is not nervous/anxious.      Physical Exam   Constitutional: She is oriented to person, place, and time. She appears well-developed and well-nourished. No distress.   HENT:   Head: Normocephalic and atraumatic.   Eyes: Conjunctivae and PERRL.    Neck: Normal range of motion. Neck supple. No JVD present.   Cardiovascular: Normal rate, regular rhythm, normal heart sounds and intact distal pulses.    No murmur heard.  Pulmonary/Chest: Effort normal and breath sounds normal. No respiratory distress. She has no wheezes. She has no rales.   Abdominal: Soft. Bowel sounds are normal. She exhibits no distension and no mass. There is no tenderness. There is no guarding.   Musculoskeletal: Normal range of motion. She exhibits no edema.   Neurological: She is alert and oriented to person, place, and time. No cranial nerve deficit or sensory deficit. Coordination normal.   Skin: Skin is warm and dry. Capillary refill takes 2 to 3 seconds. No rash noted. She is not diaphoretic. No erythema.   Psychiatric: She has a normal mood and affect. Her behavior is normal.  Judgment and thought content normal.   Nursing note and vitals reviewed.    Significant Diagnostic Studies: Labs:   BMP:   Recent Labs  Lab 05/09/18  0530 05/10/18  0437   *  224* 136*     138 139   K 5.3*  5.3* 5.0     107 108   CO2 22*  22* 21*   BUN 27*  27* 26*   CREATININE 1.1  1.1 1.0   CALCIUM 9.7  9.7 9.4   MG 1.8 1.8   , CBC   Recent Labs  Lab 05/09/18  0530 05/10/18  0437   WBC 17.35*  17.35* 18.27*   HGB 11.0*  11.0* 9.8*   HCT 36.2*  36.2* 32.2*     308 281   , Lipid Panel   Lab Results   Component Value Date    CHOL 113 (L) 05/04/2018    HDL 40 05/04/2018    LDLCALC 47.4 (L) 05/04/2018    TRIG 128 05/04/2018    CHOLHDL 35.4 05/04/2018   , Troponin   Recent Labs  Lab 05/04/18  0529   TROPONINI <0.006   , A1C:   Recent Labs  Lab 04/10/18  1800 04/25/18  1127 05/04/18  0528   HGBA1C 5.4 5.6 5.5    and All labs within the past 24 hours have been reviewed    Pending Diagnostic Studies:     None         Medications:  Reconciled Home Medications:      Medication List      START taking these medications    apixaban 5 mg Tab  Take 1 tablet (5 mg total) by mouth 2 (two) times daily.     clopidogrel 75 mg tablet  Commonly known as:  PLAVIX  Take 1 tablet (75 mg total) by mouth once daily.  Start taking on:  5/11/2018     gabapentin 400 MG capsule  Commonly known as:  NEURONTIN  Take 1 capsule (400 mg total) by mouth 3 (three) times daily.  Replaces:  gabapentin 800 MG tablet     nitroGLYCERIN 0.4 MG SL tablet  Commonly known as:  NITROSTAT  Place 1 tablet (0.4 mg total) under the tongue every 5 (five) minutes as needed for Chest pain.        CHANGE how you take these medications    atorvastatin 40 MG tablet  Commonly known as:  LIPITOR  Take 1 tablet (40 mg total) by mouth every evening.  What changed:  See the new instructions.     metoprolol succinate 25 MG 24 hr tablet  Commonly known as:  TOPROL-XL  Take 1 tablet (25 mg total) by mouth once daily.  Start taking on:   5/11/2018  What changed:  · medication strength  · how much to take     oxybutynin 5 MG Tr24  Commonly known as:  DITROPAN-XL  TAKE 1 TABLET BY MOUTH EVERY DAY AFTER DINNER  What changed:  See the new instructions.     tiZANidine 4 MG tablet  Commonly known as:  ZANAFLEX  TAKE 1 TABLET BY MOUTH THREE TIMES DAILY AS NEEDED FOR MUSCLE SPASMS  What changed:  See the new instructions.        CONTINUE taking these medications    aspirin 81 MG EC tablet  Commonly known as:  ECOTRIN  Take 1 tablet (81 mg total) by mouth once daily.     citalopram 10 MG tablet  Commonly known as:  CELEXA  Take 1 tablet (10 mg total) by mouth once daily.     hydrocodone-acetaminophen 10-325mg  mg Tab  Commonly known as:  NORCO  Take 1 tablet by mouth every 6 (six) hours as needed for Pain (breakthrough pain only).     lisinopril 20 MG tablet  Commonly known as:  PRINIVIL,ZESTRIL  Take 1 tablet (20 mg total) by mouth once daily.     loratadine 10 mg tablet  Commonly known as:  CLARITIN  Take 1 tablet (10 mg total) by mouth once daily.     multivitamin capsule  Take 1 capsule by mouth once daily.     omeprazole 40 MG capsule  Commonly known as:  PRILOSEC  Take 1 capsule (40 mg total) by mouth once daily.     ondansetron 4 MG Tbdl  Commonly known as:  ZOFRAN-ODT  Take 1 tablet (4 mg total) by mouth every 6 (six) hours as needed.     traMADol 50 mg tablet  Commonly known as:  ULTRAM  Take 1 tablet (50 mg total) by mouth every 6 (six) hours as needed.     vitamin D 1000 units Tab  Take 1,000 Units by mouth once daily.        STOP taking these medications    gabapentin 800 MG tablet  Commonly known as:  NEURONTIN  Replaced by:  gabapentin 400 MG capsule          Indwelling Lines/Drains at time of discharge:   Lines/Drains/Airways     Central Venous Catheter Line                 Port A Cath Single Lumen right subclavian -- days              Time spent on the discharge of patient: 33 minutes  Patient was seen and examined on the date of  discharge and determined to be suitable for discharge.      Karen Mejia, MARTIN, AG-ACNP, BC  Department of [facility] Medicine  [facility]

## 2018-05-10 NOTE — ASSESSMENT & PLAN NOTE
-transitioned to high intensity statin  -lipid panel: Chol 113, , HDL 40, LDL 47.4  -cardiac/ADA diet encouraged at home

## 2018-05-10 NOTE — PROGRESS NOTES
Pt c/o generalized itching. No PRN medications in EPIC. MD Jorge Luis notified. TORB given for 50mg PO Benadryl x1. Will continue to monitor.

## 2018-05-10 NOTE — NURSING
Small amount of drainage noted to [facility] groin site. Pt complain of pain to site. Held pressure for 5 minutes. Notified Amanda Mejia. Will notify night nurse also.

## 2018-05-10 NOTE — ASSESSMENT & PLAN NOTE
-see above for details  -BP WNL to slightly elevated  -continue low dose BB  -resume lisinopril at discharge

## 2018-05-10 NOTE — NURSING
Pt left the floor with all her belongs. Given information on D/C home instructions. Pt verbalized understanding. IVs and tele removed.

## 2018-05-10 NOTE — PLAN OF CARE
Problem: Patient Care Overview  Goal: Plan of Care Review  Outcome: Ongoing (interventions implemented as appropriate)  Post-cath frequents completed and charted in EPIC, slight bleeding to site. Bleeding is controlled. Small hematoma noted to site, MD Salty notified. Ordered to continue to monitor. CBG monitored and SSI administered per orderset. Pt educated on fall risks overnight, Pt remained free from falls/trauma/injury. VSS. Denies any CP, SOB, palpitations, and dizziness. Pt c/o back pain poorly relieved by PRN pain medication. Turning/repositioning independently in bed. Plan of care reviewed with patient and all questions answered, verbalizes understanding. No acute distress noted. Will continue to monitor.

## 2018-05-11 LAB
BACTERIA UR CULT: NORMAL
BACTERIA UR CULT: NORMAL

## 2018-05-11 NOTE — PLAN OF CARE
05/11/18 0724   Final Note   Assessment Type Final Discharge Note   Discharge Disposition Home   Hospital Follow Up  Appt(s) scheduled? Yes   Records faxed to Dr Pollack at [facility]

## 2018-05-14 LAB
POC ACTIVATED CLOTTING TIME K: 202 SEC (ref 74–137)
SAMPLE: ABNORMAL

## 2018-05-14 NOTE — PHYSICIAN QUERY
"PT Name: Elías Muñoz  MR #: [id number]    Physician Query Form - Heart  Condition Clarification     CDS/: Elza Mota RN, CCDS              Contact information: [email or web address]  This form is a permanent document in the medical record.     Query Date: May 14, 2018    By submitting this query, we are merely seeking further clarification of documentation. Please utilize your independent clinical judgment when addressing the question(s) below.    The medical record contains the following   Indicators     Supporting Clinical Findings Location in Medical Record   X BNP 82 5/4 lab   X EF 65-70% 5/4 echo    Radiology findings     X Echo Results  1 - Upper limit of normal left ventricular enlargement.     2 - Normal left ventricular systolic function (EF 65-70%).     3 - Severe left atrial enlargement.     4 - No wall motion abnormalities.     5 - Indeterminate LV diastolic function.     6 - Normal right ventricular systolic function .     7 - Trivial to mild mitral regurgitation.     8 - Trivial to mild tricuspid regurgitation.     9 - The estimated PA systolic pressure is 28 mmHg. 5/4 echo    "Ascites" documented      "SOB" or "HENDERSON" documented      "Hypoxia" documented     X Heart Failure documented History: CHF (congestive heart failure)  Diastolic heart failure 5/4 h/p, 5/4 consult note    "Edema" documented     X Diuretics/Meds Metoprolol 25 mg po 2 x daily   Metoprolol 12.5 mg po 2 x daily 5/5- 5/7 mar  5/7-5/10 mar    Treatment:     X Other:  Metoprolol held d/t hypotension 5/6 nurse note       Provider, please specify diagnosis or diagnoses associated with above clinical findings.      [  ] Chronic Diastolic Heart Failure (EF > 40)*  [  ] Other Type of Heart Failure (please specify type): _________________________  [ x ] Heart Failure Ruled Out  [  ] Other (please specify): ___________________________________  [  ] Clinically Undetermined            *American Heart Association                     "                                                                                      Please document in your progress notes daily for the duration of treatment until resolved and include in your discharge summary.

## 2018-05-21 PROBLEM — R13.10 DYSPHAGIA: Status: ACTIVE | Noted: 2017-08-09

## 2018-05-21 PROBLEM — Z95.828 PORT-A-CATH IN PLACE: Status: ACTIVE | Noted: 2018-05-21

## 2018-06-01 DIAGNOSIS — I25.10 CORONARY ARTERY DISEASE, ANGINA PRESENCE UNSPECIFIED, UNSPECIFIED VESSEL OR LESION TYPE, UNSPECIFIED WHETHER NATIVE OR TRANSPLANTED HEART: Primary | ICD-10-CM

## 2018-06-12 ENCOUNTER — OFFICE VISIT (OUTPATIENT)
Dept: CARDIOLOGY | Facility: CLINIC | Age: 78
End: 2018-06-12
Payer: MEDICARE

## 2018-06-12 ENCOUNTER — DOCUMENTATION ONLY (OUTPATIENT)
Dept: CARDIOLOGY | Facility: CLINIC | Age: 78
End: 2018-06-12

## 2018-06-12 VITALS
OXYGEN SATURATION: 97 % | DIASTOLIC BLOOD PRESSURE: 60 MMHG | HEIGHT: 67 IN | SYSTOLIC BLOOD PRESSURE: 130 MMHG | BODY MASS INDEX: 29.69 KG/M2 | HEART RATE: 50 BPM | WEIGHT: 189.13 LBS

## 2018-06-12 DIAGNOSIS — I10 ESSENTIAL HYPERTENSION: ICD-10-CM

## 2018-06-12 DIAGNOSIS — I25.10 CORONARY ARTERY DISEASE INVOLVING NATIVE CORONARY ARTERY OF NATIVE HEART WITHOUT ANGINA PECTORIS: ICD-10-CM

## 2018-06-12 DIAGNOSIS — I48.0 PAROXYSMAL ATRIAL FIBRILLATION: ICD-10-CM

## 2018-06-12 DIAGNOSIS — E78.5 HYPERLIPIDEMIA, UNSPECIFIED HYPERLIPIDEMIA TYPE: ICD-10-CM

## 2018-06-12 PROCEDURE — 99999 PR PBB SHADOW E&M-EST. PATIENT-LVL III: CPT | Mod: PBBFAC,,, | Performed by: INTERNAL MEDICINE

## 2018-06-12 PROCEDURE — 99215 OFFICE O/P EST HI 40 MIN: CPT | Mod: S$GLB,,, | Performed by: INTERNAL MEDICINE

## 2018-06-12 PROCEDURE — 3078F DIAST BP <80 MM HG: CPT | Mod: CPTII,S$GLB,, | Performed by: INTERNAL MEDICINE

## 2018-06-12 PROCEDURE — 3075F SYST BP GE 130 - 139MM HG: CPT | Mod: CPTII,S$GLB,, | Performed by: INTERNAL MEDICINE

## 2018-06-12 RX ORDER — SODIUM CHLORIDE 9 MG/ML
3 INJECTION, SOLUTION INTRAVENOUS CONTINUOUS
Status: CANCELLED | OUTPATIENT
Start: 2018-06-12 | End: 2018-06-12

## 2018-06-12 RX ORDER — DIPHENHYDRAMINE HCL 25 MG
50 CAPSULE ORAL ONCE
Status: CANCELLED | OUTPATIENT
Start: 2018-06-12 | End: 2018-06-12

## 2018-06-12 NOTE — PROGRESS NOTES
OUTPATIENT CATHETERIZATION INSTRUCTIONS    You have been scheduled for a procedure in the catheterization lab on Monday, June 25, 2018.     Please report to the Cardiology Waiting Area on the Third floor of the hospital and check in at 7:30 AM.   You will then be taken to the SSCU (Short Stay Cardiac Unit) and prepared for your procedure. Please be aware that this is not the time of your procedure but the time you are to arrive. The procedures are scheduled on an hourly basis; however, emergency cases take precedence over all other cases.       You may not have anything to eat or drink after midnight the night before your test. You may take your regular morning medications with water. If there are any medications that you should not take you will be instructed to hold them that morning. If you are diabetic and on Metformin (Glucophage) do not take it the day before, the day of, and for 2 days after your procedure.      The procedure will take 1-2 hours to perform. After the procedure, you will return to SSCU on the third floor of the hospital. You will need to lie still (or keep your arm still) for the next 4 to 6 hours to minimize bleeding from the puncture site. Your family may remain in the room with you during this time.       You may be able to be discharged home that same afternoon if there is someone to drive you home and there were no complications. If you have one of the balloon, stent, or device procedures you may spend the night in the hospital. Your doctor will determine, based on your progress, the date and time of your discharge. The results of your procedure will be discussed with you before you are discharged. Any further testing or procedures will be scheduled for you either before you leave or you will be called with these appointments.       If you should have any questions, concerns, or need to change the date of your procedure, please call  CHRIS Lynch @ [phone]    Special  Instructions:  Hold your apixiban (Eliquis) for 3 days before your procedure. Your last dose will be on: Thursday, June 21 2018.     Because you are sensitive to Iodine Dye and dye is used to perform the test, your doctor would like you to take several medications before the procedure to help prevent a severe reaction. You will be given prescriptions for Benadryl 50mg, Prednisone 50mg, and Tagamet 300mg. You will be given 3 of each pill. Take 1 of each pill (Benadryl, Prednisone, and Tagamet) at 6pm the night before your procedure, 1 of each pill (Benadryl, Prednisone, and Tagamet) at 11pm the night before your procedure, and 1 of each pill (Benadryl, Prednisone, and Tagamet) at 6am the morning of your procedure.              THE ABOVE INSTRUCTIONS WERE GIVEN TO THE PATIENT VERBALLY AND THEY VERBALIZED UNDERSTANDING.  THEY DO NOT REQUIRE ANY SPECIAL NEEDS AND DO NOT HAVE ANY LEARNING BARRIERS.          Directions for Reporting to Cardiology Waiting Area in the Hospital  If you park in the Parking Garage:  Take elevators to the1st floor of the parking garage.  Continue past the gift shop, coffee shop, and piano.  Take a right and go to the gold elevators. (Elevator B)  Take the elevator to the 3rd floor.  Follow the arrow on the sign on the wall that says Cath Lab Registration/EP Lab Registration.  Follow the long hallway all the way around until you come to a big open area.  This is the registration area.  Check in at Reception Desk.    OR    If family is dropping you off:  Have them drop you off at the front of the Hospital under the green overhang.  Enter through the doors and take a right.  Take the E elevators to the 3rd floor Cardiology Waiting Area.  Check in at the Reception Desk in the waiting room.

## 2018-06-12 NOTE — ASSESSMENT & PLAN NOTE
Multivessel CAD s/p LM PCI with JACQUES with residual mLAD  and RCA disease.  Patient reports CCS II angina symptoms - have used SL NTG x 2 since discharge.   Will attempt LAD  intervention.    - Plan for LHC (JACQUES candidate)  - Iodine Allergy so dye prep  - R CFA 6 Fr   - DAPT  - Hold Eliquis 3 days pre procedure.  - Keep NPO   - Pre-cath IVF ordered  -The risks, benefits and alternatives of the procedure were explained to the patient.   -The risks of coronary angiography include but are not limited to: bleeding, infection, heart rhythm abnormalities, allergic reactions, kidney injury, stroke and death.   -The risks of moderate sedation include hypotension, respiratory depression, arrhythmias, bronchospasm, and death.   - Informed consent was obtained and the patient is agreeable to proceed with the procedure.

## 2018-06-25 ENCOUNTER — HOSPITAL ENCOUNTER (OUTPATIENT)
Facility: HOSPITAL | Age: 78
Discharge: HOME | End: 2018-06-26
Attending: INTERNAL MEDICINE | Admitting: INTERNAL MEDICINE
Payer: MEDICARE

## 2018-06-25 DIAGNOSIS — I25.10 CORONARY ARTERY DISEASE INVOLVING NATIVE CORONARY ARTERY OF NATIVE HEART WITHOUT ANGINA PECTORIS: Primary | ICD-10-CM

## 2018-06-25 DIAGNOSIS — Z98.61 POSTSURGICAL PERCUTANEOUS TRANSLUMINAL CORONARY ANGIOPLASTY STATUS: Primary | ICD-10-CM

## 2018-06-25 DIAGNOSIS — R07.9 CHEST PAIN: ICD-10-CM

## 2018-06-25 DIAGNOSIS — I48.92 ATRIAL FLUTTER: ICD-10-CM

## 2018-06-25 LAB
ANION GAP SERPL CALC-SCNC: 10 MMOL/L
BASOPHILS # BLD AUTO: 0.02 K/UL
BASOPHILS NFR BLD: 0.1 %
BUN SERPL-MCNC: 20 MG/DL
CALCIUM SERPL-MCNC: 10.7 MG/DL
CHLORIDE SERPL-SCNC: 105 MMOL/L
CO2 SERPL-SCNC: 26 MMOL/L
CREAT SERPL-MCNC: 1.2 MG/DL
DIFFERENTIAL METHOD: ABNORMAL
EOSINOPHIL # BLD AUTO: 0 K/UL
EOSINOPHIL NFR BLD: 0 %
ERYTHROCYTE [DISTWIDTH] IN BLOOD BY AUTOMATED COUNT: 13.6 %
EST. GFR  (AFRICAN AMERICAN): 50.7 ML/MIN/1.73 M^2
EST. GFR  (NON AFRICAN AMERICAN): 44 ML/MIN/1.73 M^2
GLUCOSE SERPL-MCNC: 182 MG/DL
HCT VFR BLD AUTO: 39.2 %
HGB BLD-MCNC: 12.5 G/DL
IMM GRANULOCYTES # BLD AUTO: 0.11 K/UL
IMM GRANULOCYTES NFR BLD AUTO: 0.6 %
LYMPHOCYTES # BLD AUTO: 1.6 K/UL
LYMPHOCYTES NFR BLD: 9.1 %
MCH RBC QN AUTO: 28.7 PG
MCHC RBC AUTO-ENTMCNC: 31.9 G/DL
MCV RBC AUTO: 90 FL
MONOCYTES # BLD AUTO: 1.2 K/UL
MONOCYTES NFR BLD: 6.9 %
NEUTROPHILS # BLD AUTO: 14.8 K/UL
NEUTROPHILS NFR BLD: 83.3 %
NRBC BLD-RTO: 0 /100 WBC
PLATELET # BLD AUTO: 329 K/UL
PMV BLD AUTO: 11 FL
POTASSIUM SERPL-SCNC: 3.6 MMOL/L
RBC # BLD AUTO: 4.36 M/UL
SODIUM SERPL-SCNC: 141 MMOL/L
WBC # BLD AUTO: 17.75 K/UL

## 2018-06-25 PROCEDURE — 63600175 PHARM REV CODE 636 W HCPCS

## 2018-06-25 PROCEDURE — 25000003 PHARM REV CODE 250: Performed by: INTERNAL MEDICINE

## 2018-06-25 PROCEDURE — 63600175 PHARM REV CODE 636 W HCPCS: Performed by: INTERNAL MEDICINE

## 2018-06-25 PROCEDURE — 36415 COLL VENOUS BLD VENIPUNCTURE: CPT

## 2018-06-25 PROCEDURE — C1769 GUIDE WIRE: HCPCS

## 2018-06-25 PROCEDURE — 27000014 CATH LAB PROCEDURE

## 2018-06-25 PROCEDURE — 92928 PRQ TCAT PLMT NTRAC ST 1 LES: CPT | Mod: RC,,, | Performed by: INTERNAL MEDICINE

## 2018-06-25 PROCEDURE — 99152 MOD SED SAME PHYS/QHP 5/>YRS: CPT | Mod: 59,,, | Performed by: INTERNAL MEDICINE

## 2018-06-25 PROCEDURE — 93005 ELECTROCARDIOGRAM TRACING: CPT | Mod: 59

## 2018-06-25 PROCEDURE — 93010 ELECTROCARDIOGRAM REPORT: CPT | Mod: ,,, | Performed by: INTERNAL MEDICINE

## 2018-06-25 PROCEDURE — 25000003 PHARM REV CODE 250

## 2018-06-25 PROCEDURE — 85025 COMPLETE CBC W/AUTO DIFF WBC: CPT

## 2018-06-25 PROCEDURE — 93010 ELECTROCARDIOGRAM REPORT: CPT | Mod: 76,,, | Performed by: INTERNAL MEDICINE

## 2018-06-25 PROCEDURE — 80048 BASIC METABOLIC PNL TOTAL CA: CPT

## 2018-06-25 PROCEDURE — 99152 MOD SED SAME PHYS/QHP 5/>YRS: CPT | Mod: 59

## 2018-06-25 RX ORDER — DIPHENHYDRAMINE HCL 50 MG
50 CAPSULE ORAL ONCE
Status: DISCONTINUED | OUTPATIENT
Start: 2018-06-25 | End: 2018-06-26 | Stop reason: HOSPADM

## 2018-06-25 RX ORDER — CETIRIZINE HYDROCHLORIDE 10 MG/1
10 TABLET ORAL DAILY
Status: DISCONTINUED | OUTPATIENT
Start: 2018-06-26 | End: 2018-06-26 | Stop reason: HOSPADM

## 2018-06-25 RX ORDER — CHOLECALCIFEROL (VITAMIN D3) 25 MCG
1000 TABLET ORAL DAILY
Status: DISCONTINUED | OUTPATIENT
Start: 2018-06-26 | End: 2018-06-26 | Stop reason: HOSPADM

## 2018-06-25 RX ORDER — METOPROLOL SUCCINATE 25 MG/1
25 TABLET, EXTENDED RELEASE ORAL DAILY
Status: DISCONTINUED | OUTPATIENT
Start: 2018-06-26 | End: 2018-06-26

## 2018-06-25 RX ORDER — CITALOPRAM 10 MG/1
10 TABLET ORAL DAILY
Status: DISCONTINUED | OUTPATIENT
Start: 2018-06-26 | End: 2018-06-26 | Stop reason: HOSPADM

## 2018-06-25 RX ORDER — METOPROLOL SUCCINATE 25 MG/1
25 TABLET, EXTENDED RELEASE ORAL ONCE
Status: COMPLETED | OUTPATIENT
Start: 2018-06-25 | End: 2018-06-25

## 2018-06-25 RX ORDER — NITROGLYCERIN 0.4 MG/1
0.4 TABLET SUBLINGUAL EVERY 5 MIN PRN
Status: DISCONTINUED | OUTPATIENT
Start: 2018-06-25 | End: 2018-06-26 | Stop reason: HOSPADM

## 2018-06-25 RX ORDER — RAMELTEON 8 MG/1
8 TABLET ORAL NIGHTLY PRN
Status: DISCONTINUED | OUTPATIENT
Start: 2018-06-25 | End: 2018-06-26 | Stop reason: HOSPADM

## 2018-06-25 RX ORDER — GABAPENTIN 400 MG/1
400 CAPSULE ORAL 3 TIMES DAILY
Status: DISCONTINUED | OUTPATIENT
Start: 2018-06-25 | End: 2018-06-26 | Stop reason: HOSPADM

## 2018-06-25 RX ORDER — HYDROCODONE BITARTRATE AND ACETAMINOPHEN 10; 325 MG/1; MG/1
1 TABLET ORAL EVERY 6 HOURS PRN
Status: DISCONTINUED | OUTPATIENT
Start: 2018-06-25 | End: 2018-06-26 | Stop reason: HOSPADM

## 2018-06-25 RX ORDER — POLYETHYLENE GLYCOL 3350 17 G/17G
17 POWDER, FOR SOLUTION ORAL ONCE
Status: COMPLETED | OUTPATIENT
Start: 2018-06-25 | End: 2018-06-25

## 2018-06-25 RX ORDER — SODIUM CHLORIDE 9 MG/ML
3 INJECTION, SOLUTION INTRAVENOUS CONTINUOUS
Status: ACTIVE | OUTPATIENT
Start: 2018-06-25 | End: 2018-06-25

## 2018-06-25 RX ORDER — TIZANIDINE 4 MG/1
4 TABLET ORAL EVERY 8 HOURS PRN
Status: DISCONTINUED | OUTPATIENT
Start: 2018-06-25 | End: 2018-06-26 | Stop reason: HOSPADM

## 2018-06-25 RX ORDER — TRAMADOL HYDROCHLORIDE 50 MG/1
50 TABLET ORAL EVERY 6 HOURS PRN
Status: DISCONTINUED | OUTPATIENT
Start: 2018-06-25 | End: 2018-06-26 | Stop reason: HOSPADM

## 2018-06-25 RX ORDER — ASPIRIN 81 MG/1
81 TABLET ORAL DAILY
Status: DISCONTINUED | OUTPATIENT
Start: 2018-06-26 | End: 2018-06-26 | Stop reason: HOSPADM

## 2018-06-25 RX ORDER — CLOPIDOGREL BISULFATE 75 MG/1
75 TABLET ORAL DAILY
Status: DISCONTINUED | OUTPATIENT
Start: 2018-06-25 | End: 2018-06-25

## 2018-06-25 RX ORDER — ATORVASTATIN CALCIUM 20 MG/1
40 TABLET, FILM COATED ORAL NIGHTLY
Status: DISCONTINUED | OUTPATIENT
Start: 2018-06-25 | End: 2018-06-26 | Stop reason: HOSPADM

## 2018-06-25 RX ORDER — OXYBUTYNIN CHLORIDE 5 MG/1
5 TABLET, EXTENDED RELEASE ORAL
Status: DISCONTINUED | OUTPATIENT
Start: 2018-06-25 | End: 2018-06-26 | Stop reason: HOSPADM

## 2018-06-25 RX ORDER — LISINOPRIL 20 MG/1
20 TABLET ORAL 2 TIMES DAILY
Status: DISCONTINUED | OUTPATIENT
Start: 2018-06-25 | End: 2018-06-26 | Stop reason: HOSPADM

## 2018-06-25 RX ORDER — NAPROXEN SODIUM 220 MG/1
81 TABLET, FILM COATED ORAL DAILY
Status: DISCONTINUED | OUTPATIENT
Start: 2018-06-25 | End: 2018-06-25

## 2018-06-25 RX ORDER — CLOPIDOGREL BISULFATE 75 MG/1
75 TABLET ORAL DAILY
Status: DISCONTINUED | OUTPATIENT
Start: 2018-06-26 | End: 2018-06-26 | Stop reason: HOSPADM

## 2018-06-25 RX ORDER — LISINOPRIL 20 MG/1
20 TABLET ORAL ONCE
Status: COMPLETED | OUTPATIENT
Start: 2018-06-25 | End: 2018-06-25

## 2018-06-25 RX ORDER — METOPROLOL TARTRATE 1 MG/ML
5 INJECTION, SOLUTION INTRAVENOUS ONCE
Status: COMPLETED | OUTPATIENT
Start: 2018-06-26 | End: 2018-06-25

## 2018-06-25 RX ADMIN — NITROGLYCERIN 0.4 MG: 0.4 TABLET SUBLINGUAL at 11:06

## 2018-06-25 RX ADMIN — SODIUM CHLORIDE 3 ML/KG/HR: 0.9 INJECTION, SOLUTION INTRAVENOUS at 10:06

## 2018-06-25 RX ADMIN — METOPROLOL TARTRATE 5 MG: 5 INJECTION, SOLUTION INTRAVENOUS at 11:06

## 2018-06-25 RX ADMIN — LISINOPRIL 20 MG: 20 TABLET ORAL at 04:06

## 2018-06-25 RX ADMIN — TRAMADOL HYDROCHLORIDE 50 MG: 50 TABLET, FILM COATED ORAL at 10:06

## 2018-06-25 RX ADMIN — POLYETHYLENE GLYCOL 3350 17 G: 17 POWDER, FOR SOLUTION ORAL at 10:06

## 2018-06-25 RX ADMIN — METOPROLOL SUCCINATE 25 MG: 25 TABLET, EXTENDED RELEASE ORAL at 04:06

## 2018-06-25 RX ADMIN — ASPIRIN 81 MG CHEWABLE TABLET 81 MG: 81 TABLET CHEWABLE at 10:06

## 2018-06-25 RX ADMIN — CLOPIDOGREL 75 MG: 75 TABLET, FILM COATED ORAL at 10:06

## 2018-06-25 RX ADMIN — ATORVASTATIN CALCIUM 40 MG: 20 TABLET, FILM COATED ORAL at 09:06

## 2018-06-25 RX ADMIN — TIZANIDINE 4 MG: 4 TABLET ORAL at 10:06

## 2018-06-25 RX ADMIN — APIXABAN 5 MG: 2.5 TABLET, FILM COATED ORAL at 10:06

## 2018-06-25 RX ADMIN — GABAPENTIN 400 MG: 400 CAPSULE ORAL at 09:06

## 2018-06-25 RX ADMIN — HYDROCODONE BITARTRATE AND ACETAMINOPHEN 1 TABLET: 10; 325 TABLET ORAL at 10:06

## 2018-06-25 RX ADMIN — LISINOPRIL 20 MG: 20 TABLET ORAL at 09:06

## 2018-06-25 RX ADMIN — OXYBUTYNIN CHLORIDE 5 MG: 5 TABLET, FILM COATED, EXTENDED RELEASE ORAL at 09:06

## 2018-06-25 NOTE — CONSULTS
"Cardiac Rehab     Elías Muñoz   [id number]   6/25/2018       Activity taught: Yes    Cardiac Rehab Phase Taught: Phase I & II    Risk Factors-Modifiable: nutrition, hyperlipidemia, hypertension, sedentary lifestyle, stress, exercise    Risk Factors-Non modifiable: age    Teaching Method: Verbal, Written and Living with Heart Disease book.    Understanding/Response: Pt verbalized understanding, all questions answered. Pt understands their cardiac rehab order is good for 12 months.   Pt lives in [place]    Comments: S/P PTCA. Discussed cardiac rehab and risk factor modification. Team to refer patient to [facility] Cardiac Rehab Educational materials were used in the process and given to the patient. They included "Your Guide to Living with Heart Disease", Phase Two Cardiac Rehabilitation information along with a sample Mediterranean diet.The patient expressed understanding of the teaching and expressed desire to take a role in modifying the risk factors when they return home.    RAINA Javier RN  Cardiac Rehab Nurse      "

## 2018-06-25 NOTE — OP NOTE
"    Post Cath Note  Referring Physician: Chai Christie MD  Procedure: Coronary angiography (N/A)       Access: Right CFA      See full report for further details    Intervention:   LAD JACQUES  RCA JACQUES  Closure device: Manual pressure    Post Cath Exam:   BP (!) 142/64 (BP Location: Right arm, Patient Position: Lying)   Pulse (!) 54   Temp 97.8 °F (36.6 °C) (Oral)   Resp 18   Ht 5' 7" (1.702 m)   Wt 86.2 kg (190 lb)   LMP 11/02/2015 (Exact Date)   SpO2 97%   Breastfeeding? No   BMI 29.76 kg/m²   No unusual pain, hematoma, thrill or bruit at vascular access site.  Distal pulse present without signs of ischemia.    Recommendations:   - Routine post-cath care  - IVF at 3cc/kg/hr x 4 for 4 hrs  - DAPT, Continue medical management, Follow-up with outpatient cardiologist    AMANDA DUFFY  PGY 4  "

## 2018-06-25 NOTE — H&P (VIEW-ONLY)
Subjective:    Patient ID:  Elías Muñoz is a 78 y.o. female who presents for follow-up of CAD        Allergies: Iodine.  Referring Physicians: Dr. Long and Dr. Casas.    HPI  78 year old female with past medical history of HTN, HLD, DM2, paroxysmal afib, multivessel CAD ( LM s/p PCI, mLAD , mRCA 75%), lumbar DJD with chronic low back pain, fall risk due to unsteady gait, and breast cancer s/p left mastectomy who is a [employer] s/p LM intervention on 5/9/18 here for follow up.    She was admitted at [facility] in May with Unstable angina - found to have multivessel CAD and deemed high risk for CABG by both CTS at [facility] and [facility] main [place].  Her symptoms were chest pain pre LM stent.      Her activity level is 4 METS. Reports CCS II angina.     [facility] 5/9/18:       Patient has a right dominant coronary artery.        - Left Main Coronary Artery:             The LM has a 95% stenosis. There is RIGOBERTO 3 flow.     - Left Anterior Descending Artery:             The ostial LAD has a 60% stenosis. There is RIGOBERTO 3 flow.             The mid LAD has chronic total occlusion. There is RIGOBERTO 0 flow.                     Lesion Details:  Heavy calcification. Distal RCA filled via collaterals. Able to cross mid LAD  with a wire but could not advance a 1.2 balloon.      - Left Circumflex Artery:             The ostial LCX has luminal irregularities. There is RIGOBERTO 3 flow.     - Right Coronary Artery:             The RCA was not studied.     - Common Femoral Artery:             The right CFA is normal.     - Femoral Artery:             The femoral artery is normal.      Intervention          LM:              The lesion was successfully intervened. Post-stenosis of 0%, post-RIGOBERTO 3 flow and TMP grade 3. The vessel was accessed natively.  The following items were used: Cath Emerge Otw 12 X 2.50, Cath Mini Trek 1.2x20 Rapid Exchange, 3.0MM 12MM [place]   Balloon, Stent Promus Premier Mr 3.5 X 8 (JACQUES) and  4.0X20 TREK Rx Balloon.       Ostial LCX:              The lesion was successfully intervened. Post-stenosis of 0%, post-RIGOBERTO 3 flow and TMP grade 3. The vessel was accessed natively.  The following items were used: 2.5X20 TREK Rx Balloon        Echo 5/4/18:    1 - Upper limit of normal left ventricular enlargement.     2 - Normal left ventricular systolic function (EF 65-70%).     3 - Severe left atrial enlargement.     4 - No wall motion abnormalities.     5 - Indeterminate LV diastolic function.     6 - Normal right ventricular systolic function .     7 - Trivial to mild mitral regurgitation.     8 - Trivial to mild tricuspid regurgitation.     9 - The estimated PA systolic pressure is 28 mmHg.     Review of Systems   Constitution: Negative for chills, decreased appetite, fever, weakness, night sweats, weight gain and weight loss.   HENT: Negative for congestion, hoarse voice, stridor and tinnitus.    Eyes: Negative for blurred vision, pain and visual disturbance.   Cardiovascular: Negative for chest pain, claudication, dyspnea on exertion, irregular heartbeat, leg swelling, near-syncope, orthopnea, palpitations, paroxysmal nocturnal dyspnea and syncope.   Respiratory: Negative for cough, hemoptysis, shortness of breath, snoring and wheezing.    Endocrine: Negative for cold intolerance, heat intolerance and polyuria.   Hematologic/Lymphatic: Negative for bleeding problem. Does not bruise/bleed easily.   Skin: Negative for color change and rash.   Musculoskeletal: Negative for arthritis, back pain, joint pain, muscle cramps, myalgias and stiffness.   Gastrointestinal: Negative for abdominal pain, anorexia, constipation, diarrhea, dysphagia, heartburn, hemorrhoids, melena, nausea and vomiting.   Genitourinary: Negative for frequency, hematuria, hesitancy, nocturia and urgency.   Neurological: Negative for difficulty with concentration, dizziness, focal weakness, headaches, light-headedness, numbness, seizures,  "tremors and vertigo.   Psychiatric/Behavioral: Negative for altered mental status and depression. The patient does not have insomnia.    Allergic/Immunologic: Negative for hives.        Objective:    Physical Exam   Constitutional: She appears well-developed and well-nourished.   HENT:   Head: Normocephalic and atraumatic.   Right Ear: External ear normal.   Left Ear: External ear normal.   Eyes: Conjunctivae and EOM are normal. Pupils are equal, round, and reactive to light.   Neck: Normal range of motion. Neck supple. No JVD present. No thyromegaly present.   Cardiovascular: Normal rate, regular rhythm, S1 normal, S2 normal, normal heart sounds and intact distal pulses.  Exam reveals no gallop, no S3 and no friction rub.    No murmur heard.  Pulses:       Radial pulses are 2+ on the right side, and 2+ on the left side.        Femoral pulses are 2+ on the right side, and 2+ on the left side.       Dorsalis pedis pulses are 2+ on the right side, and 2+ on the left side.        Posterior tibial pulses are 2+ on the right side, and 2+ on the left side.   Pulmonary/Chest: Effort normal. No stridor. She has no wheezes. She has no rales. She exhibits no tenderness.   Abdominal: Soft. Bowel sounds are normal. She exhibits no distension. There is no tenderness. There is no rebound and no guarding.   Musculoskeletal: Normal range of motion. She exhibits no edema or tenderness.   Psychiatric: She has a normal mood and affect.    /60 (BP Location: Left arm, Patient Position: Sitting, BP Method: Large (Automatic))   Pulse (!) 50   Ht 5' 7" (1.702 m)   Wt 85.8 kg (189 lb 2.5 oz)   LMP 11/02/2015 (Exact Date)   SpO2 97%   BMI 29.63 kg/m²         Assessment/Plan:           Coronary artery disease involving native coronary artery of native heart without angina pectoris  Multivessel CAD s/p LM PCI with JACQUES with residual mLAD  and RCA disease.  Patient reports CCS II angina symptoms - have used SL NTG x 2 since " discharge.   Will attempt LAD /RCA intervention.    - Plan for LHC (JACQUES candidate)  - Iodine Allergy so dye prep  - R CFA 6 Fr   - DAPT  - Hold Eliquis 3 days pre procedure.  - Keep NPO.   - No blood products -  Jehova`s witness  - Pre-cath IVF ordered  -The risks, benefits and alternatives of the procedure were explained to the patient.   -The risks of coronary angiography include but are not limited to: bleeding, infection, heart rhythm abnormalities, allergic reactions, kidney injury, stroke and death.   -The risks of moderate sedation include hypotension, respiratory depression, arrhythmias, bronchospasm, and death.   - Informed consent was obtained and the patient is agreeable to proceed with the procedure.      HLD (hyperlipidemia)  Continue statin therapy     Essential hypertension  BP controlled. Continue with current meds.    Paroxysmal atrial fibrillation  Patient on Epixiban. Hold Epixiban 3 days before procedure.     Erik North MD  PGY-7  Interventional Cardiology     I have personally taken the history and examined this patient. I have discussed and agree with the resident's findings and plan as documented in the resident's note.  Chai Christie

## 2018-06-25 NOTE — PLAN OF CARE
Problem: Patient Care Overview  Goal: Plan of Care Review  Outcome: Ongoing (interventions implemented as appropriate)  Received report from [facility]. Patient s/p [facility] with stents, AAOx3. VSS, no c/o pain or discomfort at this time, resp even and unlabored. Gauze/tegaderm dressing to right groin is CDI. No active bleeding. No hematoma noted. Post procedure protocol reviewed with patient and patient's family. Understanding verbalized. Family members at bedside. Nurse call bell within reach. Will continue to monitor per post procedure protocol.

## 2018-06-25 NOTE — INTERVAL H&P NOTE
The patient has been examined and the H&P has been reviewed:    I concur with the findings and no changes have occurred since H&P was written.    Anesthesia/Surgery risks, benefits and alternative options discussed and understood by patient/family.          Active Hospital Problems    Diagnosis  POA    Coronary artery disease involving native coronary artery of native heart without angina pectoris [I25.10]  Yes      Resolved Hospital Problems    Diagnosis Date Resolved POA   No resolved problems to display.

## 2018-06-26 VITALS
OXYGEN SATURATION: 97 % | HEART RATE: 51 BPM | TEMPERATURE: 98 F | WEIGHT: 190 LBS | RESPIRATION RATE: 20 BRPM | HEIGHT: 67 IN | BODY MASS INDEX: 29.82 KG/M2 | SYSTOLIC BLOOD PRESSURE: 122 MMHG | DIASTOLIC BLOOD PRESSURE: 59 MMHG

## 2018-06-26 LAB
CORONARY STENOSIS: ABNORMAL
CORONARY STENT: YES
POC ACTIVATED CLOTTING TIME K: 230 SEC (ref 74–137)
POC ACTIVATED CLOTTING TIME K: 257 SEC (ref 74–137)
SAMPLE: ABNORMAL
SAMPLE: ABNORMAL

## 2018-06-26 PROCEDURE — 63600175 PHARM REV CODE 636 W HCPCS: Performed by: INTERNAL MEDICINE

## 2018-06-26 PROCEDURE — 93005 ELECTROCARDIOGRAM TRACING: CPT

## 2018-06-26 PROCEDURE — 25000003 PHARM REV CODE 250: Performed by: INTERNAL MEDICINE

## 2018-06-26 RX ORDER — METOPROLOL SUCCINATE 50 MG/1
50 TABLET, EXTENDED RELEASE ORAL DAILY
Qty: 30 TABLET | Refills: 11 | Status: SHIPPED | OUTPATIENT
Start: 2018-06-26 | End: 2019-01-15 | Stop reason: DRUGHIGH

## 2018-06-26 RX ORDER — HEPARIN 100 UNIT/ML
3 SYRINGE INTRAVENOUS ONCE
Status: COMPLETED | OUTPATIENT
Start: 2018-06-26 | End: 2018-06-26

## 2018-06-26 RX ORDER — DIPHENHYDRAMINE HCL 25 MG
25 CAPSULE ORAL ONCE
Status: COMPLETED | OUTPATIENT
Start: 2018-06-26 | End: 2018-06-26

## 2018-06-26 RX ORDER — CLOPIDOGREL BISULFATE 75 MG/1
75 TABLET ORAL DAILY
Qty: 30 TABLET | Refills: 11 | Status: SHIPPED | OUTPATIENT
Start: 2018-06-26 | End: 2018-07-31 | Stop reason: SDUPTHER

## 2018-06-26 RX ORDER — METOPROLOL SUCCINATE 50 MG/1
50 TABLET, EXTENDED RELEASE ORAL DAILY
Status: DISCONTINUED | OUTPATIENT
Start: 2018-06-26 | End: 2018-06-26 | Stop reason: HOSPADM

## 2018-06-26 RX ORDER — DIPHENHYDRAMINE HCL 25 MG
25 CAPSULE ORAL EVERY 6 HOURS PRN
Status: DISCONTINUED | OUTPATIENT
Start: 2018-06-26 | End: 2018-06-26

## 2018-06-26 RX ADMIN — RAMELTEON 8 MG: 8 TABLET, FILM COATED ORAL at 12:06

## 2018-06-26 RX ADMIN — DIPHENHYDRAMINE HYDROCHLORIDE 25 MG: 25 CAPSULE ORAL at 12:06

## 2018-06-26 RX ADMIN — HEPARIN 300 UNITS: 100 SYRINGE at 10:06

## 2018-06-26 NOTE — HPI
78 year old female with past medical history of HTN, HLD, DM2, paroxysmal afib, multivessel CAD ( LM s/p PCI, mLAD , mRCA 75%), lumbar DJD with chronic low back pain, fall risk due to unsteady gait, and breast cancer s/p left mastectomy who is a [employer] s/p LM intervention on 5/9/18 here for follow up.     She was admitted at [facility] in May with Unstable angina - found to have multivessel CAD and deemed high risk for CABG by both CTS at [facility] and [facility] main campus.  Her symptoms were chest pain pre LM stent.      Her activity level is 4 METS. Reports CCS II angina.     [facility] 5/9/18:       Patient has a right dominant coronary artery.        - Left Main Coronary Artery:             The LM has a 95% stenosis. There is RIGOBERTO 3 flow.     - Left Anterior Descending Artery:             The ostial LAD has a 60% stenosis. There is RIGOBERTO 3 flow.             The mid LAD has chronic total occlusion. There is RIGOBERTO 0 flow.                     Lesion Details:  Heavy calcification. Distal RCA filled via collaterals. Able to cross mid LAD  with a wire but could not advance a 1.2 balloon.      - Left Circumflex Artery:             The ostial LCX has luminal irregularities. There is RIGOBERTO 3 flow.     - Right Coronary Artery:             The RCA was not studied.     - Common Femoral Artery:             The right CFA is normal.     - Femoral Artery:             The femoral artery is normal.      Intervention          LM:              The lesion was successfully intervened. Post-stenosis of 0%, post-RIGOBERTO 3 flow and TMP grade 3. The vessel was accessed natively.  The following items were used: Cath Emerge Otw 12 X 2.50, Cath Mini Trek 1.2x20 Rapid Exchange, 3.0MM 12MM [place]   Balloon, Stent Promus Premier Mr 3.5 X 8 (JACQUES) and 4.0X20 TREK Rx Balloon.       Ostial LCX:              The lesion was successfully intervened. Post-stenosis of 0%, post-RIGOBERTO 3 flow and TMP grade 3. The vessel was accessed natively.  The  following items were used: 2.5X20 TREK Rx Balloon           Echo 5/4/18:    1 - Upper limit of normal left ventricular enlargement.     2 - Normal left ventricular systolic function (EF 65-70%).     3 - Severe left atrial enlargement.     4 - No wall motion abnormalities.     5 - Indeterminate LV diastolic function.     6 - Normal right ventricular systolic function .     7 - Trivial to mild mitral regurgitation.     8 - Trivial to mild tricuspid regurgitation.     9 - The estimated PA systolic pressure is 28 mmHg.

## 2018-06-26 NOTE — PLAN OF CARE
Problem: Patient Care Overview  Goal: Plan of Care Review  Outcome: Ongoing (interventions implemented as appropriate)  Pt complained of 7/10 chest pain around 11pm last night. Nitro given SL x 3 and EKG done.  Dr Kelly came to beside and evaluated patient.  Pt's chest pain resolved after 3 doses of nitro.  Pt also complaining of rash on face and neck.  Benadryl given x 1 per MD order.  Also gave 5 IVP lopressor for elevated SBP in 170s.  Will cont to monitor.

## 2018-06-26 NOTE — NURSING
Pt is AAOx3 and in no apparent distress.  Pt refused morning meds from hospital; pt states she brought her own meds and took them this morning.  Notified Dr. Kelly.  Provided a copy of discharge instructions.  Teaching performed.  Pt verbalized understanding and denied any questions.  PIV d/c catheter tip intact.  2x2 applied and no active bleeding noted.  Pt waiting for wheelchair to escort to garage.  Family at the bedside.

## 2018-06-26 NOTE — DISCHARGE INSTRUCTIONS
1. Do not strain or lift anything greater than 5 lb for 1 week.  2. Do not drive or operate any dangerous machinery for 24 hours.   3. Keep the dressing on, clean, and dry for 24 hours.   4. After 24 hours, the dressing may be removed and a shower is allowed.   5. Clean the area with mild soap and water and then cover it with a bandage.   6. Once the skin has healed, bathing in a tub or swimming is allowed.   7. Inspect the groin site daily and report to the physician any swelling at the site that   cannot be controlled with manual pressure for 10 minutes, unusual pain at the   access site or affected extremity, unusual swelling at the access site, or signs or   symptoms of infection such as redness, pain, or fever.   Call 911 if you have:   Bleeding from the puncture site that you cannot stop by doing the following:   Relax and lie down right away. Keep your leg flat and apply firm pressure to the site using your fingers and a gauze pad. Keep the pressure on for 20 minutes. Continue this until the bleeding stops. This may take awhile. When bleeding stops, cover the site with a sterile bandage and keep your leg still as much as possible.

## 2018-06-26 NOTE — PROGRESS NOTES
Patient had an episode of chest pain, Substernal similar to her angina while having BM.Chest pain relieved by sublingual nitro x3. EKG NSR , no ischemic changes.BP uncontrolled-Metoprolol 5 mg IV given with much improvement.Also complained of rash on face and neck. Will give benadryl.Adjust home bp meds.

## 2018-06-26 NOTE — DISCHARGE SUMMARY
[facility]  Interventional Cardiology  Discharge Summary      Patient Name: Elías Muñoz  MRN: [id number]  Admission Date: 6/25/2018  Hospital Length of Stay: 0 days  Discharge Date and Time:  06/26/2018 4:17 PM  Attending Physician: No att. providers found  Discharging Provider: Hunter Kelly MD  Primary Care Physician: Calin Casas MD    HPI:  78 year old female with past medical history of HTN, HLD, DM2, paroxysmal afib, multivessel CAD ( LM s/p PCI, mLAD , mRCA 75%), lumbar DJD with chronic low back pain, fall risk due to unsteady gait, and breast cancer s/p left mastectomy who is a Jain s/p LM intervention on 5/9/18 here for follow up.     She was admitted at [facility] in May with Unstable angina - found to have multivessel CAD and deemed high risk for CABG by both CTS at [facility] and [facility].  Her symptoms were chest pain pre LM stent.      Her activity level is 4 METS. Reports CCS II angina.     [facility] 5/9/18:       Patient has a right dominant coronary artery.        - Left Main Coronary Artery:             The LM has a 95% stenosis. There is RIGOBERTO 3 flow.     - Left Anterior Descending Artery:             The ostial LAD has a 60% stenosis. There is RIGOBERTO 3 flow.             The mid LAD has chronic total occlusion. There is RIGOBERTO 0 flow.                     Lesion Details:  Heavy calcification. Distal RCA filled via collaterals. Able to cross mid LAD  with a wire but could not advance a 1.2 balloon.      - Left Circumflex Artery:             The ostial LCX has luminal irregularities. There is RIGOBERTO 3 flow.     - Right Coronary Artery:             The RCA was not studied.     - Common Femoral Artery:             The right CFA is normal.     - Femoral Artery:             The femoral artery is normal.      Intervention          LM:              The lesion was successfully intervened. Post-stenosis of 0%, post-RIGOBERTO 3 flow and TMP grade 3. The vessel  was accessed natively.  The following items were used: Cath Emerge Otw 12 X 2.50, Cath Mini Trek 1.2x20 Rapid Exchange, 3.0MM 12MM [place]   Balloon, Stent Promus Premier Mr 3.5 X 8 (JACQUES) and 4.0X20 TREK Rx Balloon.       Ostial LCX:              The lesion was successfully intervened. Post-stenosis of 0%, post-RIGOBERTO 3 flow and TMP grade 3. The vessel was accessed natively.  The following items were used: 2.5X20 TREK Rx Balloon           Echo 5/4/18:    1 - Upper limit of normal left ventricular enlargement.     2 - Normal left ventricular systolic function (EF 65-70%).     3 - Severe left atrial enlargement.     4 - No wall motion abnormalities.     5 - Indeterminate LV diastolic function.     6 - Normal right ventricular systolic function .     7 - Trivial to mild mitral regurgitation.     8 - Trivial to mild tricuspid regurgitation.     9 - The estimated PA systolic pressure is 28 mmHg.     Plan for staged intervention of LAD and RCA.Patient admitted for this and underwent LHC via right CFA and tolerated the procedure well.    Procedure(s) (LRB):  Coronary angiography (N/A)     Indwelling Lines/Drains at time of discharge:  Lines/Drains/Airways     Central Venous Catheter Line                 Port A Cath Single Lumen right subclavian -- days                Hospital Course:     - Left Main Coronary Artery:             The LM has luminal irregularities. There is RIGOBERTO 3 flow. Widely patent LAD stent.      - Left Anterior Descending Artery:             The proximal LAD has a 99% stenosis. There is RIGOBERTO 3 flow.     - Left Circumflex Artery:             The LCX has luminal irregularities. There is RIGOBERTO 3 flow.     - Right Coronary Artery:             The mid RCA has a 70% stenosis. There is RIGOBERTO 3 flow.     - Common Femoral Artery:             The right CFA is normal.     - Femoral Artery:             The femoral artery is normal.      Intervention          Proximal LAD:              The lesion was successfully intervened.  Post-stenosis of 0%, post-RIGOBERTO 3 flow and TMP grade 3. The vessel was accessed natively.  The following items were used: Blln Nc  Quantum 2.5 X 12, 2.5MM 20MM [place] Balloon and Stent Resolute Rx 2.75x22   (JACQUES).       Mid RCA:              The lesion was successfully intervened. Post-stenosis of 0%, post-RIGOBERTO 3 flow and TMP grade 3. The vessel was accessed natively.  The following items were used: Blln Nc  Quantum 2.5 X 12, Blln Nc Quantum 2.75x8 and Stent Resolute Rx 2.75x14   (JACQUES).        Significant Diagnostic Studies: Labs:   CMP   Recent Labs  Lab 06/25/18  2109      K 3.6      CO2 26   *   BUN 20   CREATININE 1.2   CALCIUM 10.7*   ANIONGAP 10   ESTGFRAFRICA 50.7*   EGFRNONAA 44.0*    and CBC   Recent Labs  Lab 06/25/18  2110   WBC 17.75*   HGB 12.5   HCT 39.2          Pending Diagnostic Studies:     Procedure Component Value Units Date/Time    Basic metabolic panel [[id number]] Collected:  06/25/18 0827    Order Status:  Sent Lab Status:  In process Updated:  06/25/18 0828    Specimen:  Blood from Blood     CBC auto differential [[id number]] Collected:  06/25/18 0827    Order Status:  Sent Lab Status:  In process Updated:  06/25/18 0828    Specimen:  Blood from Blood         No new Assessment & Plan notes have been filed under this hospital service since the last note was generated.  Service: Interventional Cardiology      Discharged Condition: good    Follow Up:  Follow-up Information     eTrrence Long MD In 4 weeks.    Specialty:  Cardiology  Contact information:  [address] [place] [id number]  [phone]                 Patient Instructions:     Notify your health care provider if you experience any of the following:  temperature >100.4     Notify your health care provider if you experience any of the following:  persistent nausea and vomiting or diarrhea     Notify your health care provider if you experience any of the following:  severe uncontrolled pain     Notify  your health care provider if you experience any of the following:  redness, tenderness, or signs of infection (pain, swelling, redness, odor or green/yellow discharge around incision site)     Notify your health care provider if you experience any of the following:  difficulty breathing or increased cough     Notify your health care provider if you experience any of the following:  severe persistent headache     Notify your health care provider if you experience any of the following:  worsening rash     Notify your health care provider if you experience any of the following:  persistent dizziness, light-headedness, or visual disturbances     Notify your health care provider if you experience any of the following:  increased confusion or weakness     Notify your health care provider if you experience any of the following:     Type & Screen   Standing Status: Future  Standing Exp. Date: 08/11/19     Activity as tolerated       Medications:  Reconciled Home Medications:      Medication List      CHANGE how you take these medications    * clopidogrel 75 mg tablet  Commonly known as:  PLAVIX  Take 1 tablet (75 mg total) by mouth once daily.  What changed:  Another medication with the same name was added. Make sure you understand how and when to take each.     * clopidogrel 75 mg tablet  Commonly known as:  PLAVIX  Take 1 tablet (75 mg total) by mouth once daily.  What changed:  You were already taking a medication with the same name, and this prescription was added. Make sure you understand how and when to take each.     lisinopril 20 MG tablet  Commonly known as:  PRINIVIL,ZESTRIL  Take 1 tablet (20 mg total) by mouth once daily.  What changed:  when to take this     metoprolol succinate 50 MG 24 hr tablet  Commonly known as:  TOPROL-XL  Take 1 tablet (50 mg total) by mouth once daily.  What changed:  · medication strength  · how much to take     oxybutynin 5 MG Tr24  Commonly known as:  DITROPAN-XL  TAKE 1 TABLET BY  MOUTH EVERY DAY AFTER DINNER  What changed:  See the new instructions.        * This list has 2 medication(s) that are the same as other medications prescribed for you. Read the directions carefully, and ask your doctor or other care provider to review them with you.            CONTINUE taking these medications    apixaban 5 mg Tab  Take 1 tablet (5 mg total) by mouth 2 (two) times daily.     atorvastatin 40 MG tablet  Commonly known as:  LIPITOR  Take 1 tablet (40 mg total) by mouth every evening.     citalopram 10 MG tablet  Commonly known as:  CELEXA  Take 1 tablet (10 mg total) by mouth once daily.     gabapentin 400 MG capsule  Commonly known as:  NEURONTIN  Take 1 capsule (400 mg total) by mouth 3 (three) times daily.     HYDROcodone-acetaminophen  mg per tablet  Commonly known as:  NORCO  Take 1 tablet by mouth every 6 (six) hours as needed for Pain (breakthrough pain only).     loratadine 10 mg tablet  Commonly known as:  CLARITIN  Take 1 tablet (10 mg total) by mouth once daily.     multivitamin capsule  Take 1 capsule by mouth once daily.     nitroGLYCERIN 0.4 MG SL tablet  Commonly known as:  NITROSTAT  Place 1 tablet (0.4 mg total) under the tongue every 5 (five) minutes as needed for Chest pain.     ondansetron 4 MG Tbdl  Commonly known as:  ZOFRAN-ODT  Take 1 tablet (4 mg total) by mouth every 6 (six) hours as needed.     tiZANidine 4 MG tablet  Commonly known as:  ZANAFLEX  TAKE 1 TABLET(4mg)BY MOUTH THREE TIMES DAILY AS NEEDED FOR MUSCLE SPASMS     traMADol 50 mg tablet  Commonly known as:  ULTRAM  Take 1 tablet (50 mg total) by mouth every 6 (six) hours as needed.     vitamin D 1000 units Tab  Take 1,000 Units by mouth once daily.        STOP taking these medications    aspirin 81 MG EC tablet  Commonly known as:  ECOTRIN            Time spent on the discharge of patient: 35 minutes    Hunter Kelly MD  Interventional Cardiology  [facility]

## 2018-06-26 NOTE — HOSPITAL COURSE
- Left Main Coronary Artery:             The LM has luminal irregularities. There is RIOGBERTO 3 flow. Widely patent LAD stent.      - Left Anterior Descending Artery:             The proximal LAD has a 99% stenosis. There is RIGOBERTO 3 flow.     - Left Circumflex Artery:             The LCX has luminal irregularities. There is RIGOBERTO 3 flow.     - Right Coronary Artery:             The mid RCA has a 70% stenosis. There is RIGOBERTO 3 flow.     - Common Femoral Artery:             The right CFA is normal.     - Femoral Artery:             The femoral artery is normal.      Intervention          Proximal LAD:              The lesion was successfully intervened. Post-stenosis of 0%, post-RIGOBERTO 3 flow and TMP grade 3. The vessel was accessed natively.  The following items were used: Blln Nc  Quantum 2.5 X 12, 2.5MM 20MM [place] Balloon and Stent Resolute Rx 2.75x22   (JACQUES).       Mid RCA:              The lesion was successfully intervened. Post-stenosis of 0%, post-RIGOBERTO 3 flow and TMP grade 3. The vessel was accessed natively.  The following items were used: Blln Nc  Quantum 2.5 X 12, Blln Nc Quantum 2.75x8 and Stent Resolute Rx 2.75x14   (JACQUES).

## 2018-08-02 PROBLEM — I20.9 ANGINA, CLASS III: Status: ACTIVE | Noted: 2018-08-02

## 2018-08-02 PROBLEM — I25.10 CAD (CORONARY ARTERY DISEASE): Status: ACTIVE | Noted: 2018-08-02

## 2018-08-02 PROBLEM — I25.118 CORONARY ARTERY DISEASE OF NATIVE ARTERY OF NATIVE HEART WITH STABLE ANGINA PECTORIS: Status: ACTIVE | Noted: 2018-04-30

## 2019-02-13 ENCOUNTER — OFFICE VISIT (OUTPATIENT)
Dept: URGENT CARE | Facility: CLINIC | Age: 79
End: 2019-02-13
Payer: MEDICARE

## 2019-02-13 VITALS
BODY MASS INDEX: 30.92 KG/M2 | DIASTOLIC BLOOD PRESSURE: 70 MMHG | HEIGHT: 68 IN | WEIGHT: 204 LBS | TEMPERATURE: 98 F | SYSTOLIC BLOOD PRESSURE: 112 MMHG | HEART RATE: 94 BPM | OXYGEN SATURATION: 97 %

## 2019-02-13 DIAGNOSIS — J01.40 ACUTE NON-RECURRENT PANSINUSITIS: ICD-10-CM

## 2019-02-13 DIAGNOSIS — J02.9 ACUTE PHARYNGITIS, UNSPECIFIED ETIOLOGY: Primary | ICD-10-CM

## 2019-02-13 PROCEDURE — 3078F PR MOST RECENT DIASTOLIC BLOOD PRESSURE < 80 MM HG: ICD-10-PCS | Mod: CPTII,S$GLB,, | Performed by: FAMILY MEDICINE

## 2019-02-13 PROCEDURE — 3078F DIAST BP <80 MM HG: CPT | Mod: CPTII,S$GLB,, | Performed by: FAMILY MEDICINE

## 2019-02-13 PROCEDURE — 1101F PR PT FALLS ASSESS DOC 0-1 FALLS W/OUT INJ PAST YR: ICD-10-PCS | Mod: CPTII,S$GLB,, | Performed by: FAMILY MEDICINE

## 2019-02-13 PROCEDURE — 3074F SYST BP LT 130 MM HG: CPT | Mod: CPTII,S$GLB,, | Performed by: FAMILY MEDICINE

## 2019-02-13 PROCEDURE — 99203 OFFICE O/P NEW LOW 30 MIN: CPT | Mod: S$GLB,,, | Performed by: FAMILY MEDICINE

## 2019-02-13 PROCEDURE — 1101F PT FALLS ASSESS-DOCD LE1/YR: CPT | Mod: CPTII,S$GLB,, | Performed by: FAMILY MEDICINE

## 2019-02-13 PROCEDURE — 3074F PR MOST RECENT SYSTOLIC BLOOD PRESSURE < 130 MM HG: ICD-10-PCS | Mod: CPTII,S$GLB,, | Performed by: FAMILY MEDICINE

## 2019-02-13 PROCEDURE — 99203 PR OFFICE/OUTPT VISIT, NEW, LEVL III, 30-44 MIN: ICD-10-PCS | Mod: S$GLB,,, | Performed by: FAMILY MEDICINE

## 2019-02-13 RX ORDER — CEFDINIR 300 MG/1
300 CAPSULE ORAL 2 TIMES DAILY
Qty: 20 CAPSULE | Refills: 0 | Status: SHIPPED | OUTPATIENT
Start: 2019-02-13 | End: 2019-02-23

## 2019-02-13 RX ORDER — NAPROXEN 500 MG/1
500 TABLET ORAL 2 TIMES DAILY WITH MEALS
Qty: 20 TABLET | Refills: 0 | Status: SHIPPED | OUTPATIENT
Start: 2019-02-13 | End: 2020-01-24 | Stop reason: SDUPTHER

## 2019-02-14 NOTE — PATIENT INSTRUCTIONS
Please drink plenty of fluids.  Please get plenty of rest.  Please return here or go to the Emergency Department for any concerns or worsening of condition.  If you were given wait & see antibiotics, please wait 3-5 days before taking them, and only take them if your symptoms have worsened or not improved.  If you do begin taking the antibiotics, please take them to completion.  If you were prescribed antibiotics, please take them to completion.  If you were prescribed a narcotic medication, do not drive or operate heavy equipment or machinery while taking these medications.    You were given a decongestant (RESCON or POLY VENT Dm).  If your insurance does not cover it or you cannot afford it, it is ok to use the over the counter products listed below.  If you do not have Hypertension or any history of palpitations, it is ok to take over the counter Sudafed or Mucinex D or Allegra-D or Claritin-D or Zyrtec-D.  If you do take one of the above, it is ok to combine that with plain over the counter Mucinex or Allegra or Claritin or Zyrtec.  If for example you are taking Zyrtec -D, you can combine that with Mucinex, but not Mucinex-D.  If you are taking Mucinex-D, you can combine that with plain Allegra or Claritin or Zyrtec.   If you do have Hypertension or palpitations, it is safe to take Coricidin HBP for relief of sinus symptoms.    We recommend you take over the counter Flonase (Fluticasone) or another nasally inhaled steroid unless you are already taking one.  Nasal irrigation with a saline spray or Netti Pot like device per their directions is also recommended.  If not allergic, please take over the counter Tylenol (Acetaminophen) and/or Motrin (Ibuprofen) as directed for control of pain and/or fever.    Robitussin DM 2 teas every 4 hours as needed for cough.  If you  smoke, please stop smoking.    Please follow up with your primary care doctor or specialist as needed.  Calin Casas,  MD  [phone]      Pharyngitis: Strep (Presumed)    You have pharyngitis (sore throat). The cause is thought to be the streptococcus, or strep, bacterium. Strep throat infection can cause throat pain that is worse when swallowing, aching all over, headache, and fever. The infection may be spread by coughing, kissing, or touching others after touching your mouth or nose. Antibiotic medications are given to treat the infection.  Home care  · Rest at home. Drink plenty of fluids to avoid dehydration.  · No work or school for the first 2 days of taking the antibiotics. After this time, you will not be contagious. You can then return to work or school if you are feeling better.   · The antibiotic medication must be taken for the full 10 days, even if you feel better. This is very important to ensure the infection is treated. It is also important to prevent drug-resistant organisms from developing. If you were given an antibiotic shot, no more antibiotics are needed.  · You may use acetaminophen or ibuprofen to control pain or fever, unless another medicine was prescribed for this. If you have chronic liver or kidney disease or ever had a stomach ulcer or GI bleeding, talk with your doctor before using these medicines.  · Throat lozenges or a throat-numbing sprays can help reduce throat pain. Gargling with warm salt water can also help. Dissolve 1/2 teaspoon of salt in 1 8 ounce glass of warm water.   · Avoid salty or spicy foods, which can irritate the throat.  Follow-up care  Follow up with your healthcare provider or our staff if you are not improving over the next week.  When to seek medical advice  Call your healthcare provider right away if any of these occur:  · Fever as directed by your doctor.   · New or worsening ear pain, sinus pain, or headache  · Painful lumps in the back of neck  · Stiff neck  · Lymph nodes are getting larger  · Inability to swallow liquids, excessive drooling, or inability to open mouth  wide due to throat pain  · Signs of dehydration (very dark urine or no urine, sunken eyes, dizziness)  · Trouble breathing or noisy breathing  · Muffled voice  · New rash  Date Last Reviewed: 4/13/2015 © 2000-2016 [employer]. [address] [place] [address]. All rights reserved. This information is not intended as a substitute for professional medical care. Always follow your healthcare professional's instructions.      Acute Bacterial Rhinosinusitis (ABRS)  Acute bacterial rhinosinusitis (ABRS) is an infection of your nasal cavity and sinuses. Its caused by bacteria. Acute means that youve had symptoms for less than 12 weeks.  Understanding your sinuses  The nasal cavity is the large air-filled space behind your nose. The sinuses are a group of spaces formed by the bones of your face. They connect with your nasal cavity. ABRS causes the tissue lining these spaces to become inflamed. Mucus may not drain normally. This leads to facial pain and other symptoms.  What causes ABRS?  ABRS most often follows an upper respiratory infection caused by a virus. Bacteria then infect the lining of your nasal cavity and sinuses. But you can also get ABRS if you have:  · Nasal allergies  · Long-term nasal swelling and congestion not caused by allergies  · Blockage in the nose  Symptoms of ABRS  The symptoms of ABRS may be different for each person, and can include:  · Nasal congestion  · Runny nose  · Fluid draining from the nose down the throat (postnasal drip)  · Headache  · Cough  · Pain in the sinuses  · Thick, colored fluid from the nose (mucus)  · Fever  Diagnosing ABRS  ABRS may be diagnosed if youve had an upper respiratory infection like a cold and cough for longer than 10 to 14 days. Your health care provider will ask about your symptoms and your medical history. The provider will check your vital signs, including your temperature. Youll have a physical exam. The health care provider will  check your ears, nose, and throat. You likely wont need any tests. If ABRS comes back, you may have a culture or other tests.  Treatment for ABRS  Treatment may include:  · Antibiotic medicine. This is for symptoms that last for at least 10 to 14 days.  · Nasal corticosteroid medicine. Drops or spray used in the nose can lessen swelling and congestion.  · Over-the-counter pain medicine. This is to lessen sinus pain and pressure.  · Nasal decongestant medicine. Spray or drops may help to lessen congestion. Do not use them for more than a few days.  · Salt wash (saline irrigation). This can help to loosen mucus.  Possible complications of ABRS  ABRS may come back or become long-term (chronic).  In rare cases, ABRS may cause complications such as:   · Inflamed tissue around the brain and spinal cord (meningitis)  · Inflamed tissue around the eyes (orbital cellulitis)  · Inflamed bones around the sinuses (osteitis)  These problems may need to be treated in a hospital with intravenous (IV) antibiotic medicine or surgery.  When to call the health care provider  Call your health care provider if you have any of the following:  · Symptoms that dont get better, or get worse  · Symptoms that dont get better after 3 to 5 days on antibiotics  · Trouble seeing  · Swelling around your eyes  · Confusion or trouble staying awake   Date Last Reviewed: 3/3/2015  © 9418-7484 [employer]. [address] [place] [address], [place] [address]. All rights reserved. This information is not intended as a substitute for professional medical care. Always follow your healthcare professional's instructions.

## 2019-06-27 PROBLEM — Z92.29 HISTORY OF TAMOXIFEN THERAPY: Status: ACTIVE | Noted: 2019-06-27

## 2019-06-27 PROBLEM — Z90.12 S/P LEFT MASTECTOMY: Status: ACTIVE | Noted: 2019-06-27

## 2019-06-27 PROBLEM — M54.9 BACK PAIN: Status: ACTIVE | Noted: 2017-12-14

## 2020-06-03 PROBLEM — I48.91 ATRIAL FIBRILLATION WITH RVR: Status: ACTIVE | Noted: 2020-06-03

## 2020-06-03 PROBLEM — R41.82 AMS (ALTERED MENTAL STATUS): Status: ACTIVE | Noted: 2020-06-03

## 2020-06-03 PROBLEM — Z99.11 ON MECHANICALLY ASSISTED VENTILATION: Status: ACTIVE | Noted: 2020-06-03

## 2020-06-03 PROBLEM — A41.9 SEPSIS: Status: ACTIVE | Noted: 2020-06-03

## 2020-06-03 PROBLEM — N39.0 UTI (URINARY TRACT INFECTION): Status: ACTIVE | Noted: 2020-06-03

## 2020-06-05 PROBLEM — R63.8 ALTERATION IN NUTRITION: Status: ACTIVE | Noted: 2020-06-05

## 2020-06-07 PROBLEM — G93.41 METABOLIC ENCEPHALOPATHY: Status: ACTIVE | Noted: 2020-06-03

## 2020-06-10 PROBLEM — Z99.11 ON MECHANICALLY ASSISTED VENTILATION: Status: RESOLVED | Noted: 2020-06-03 | Resolved: 2020-06-10

## 2020-09-11 PROBLEM — I73.9 PVD (PERIPHERAL VASCULAR DISEASE): Status: ACTIVE | Noted: 2020-09-11

## 2020-09-12 PROBLEM — E87.5 HYPERKALEMIA: Status: ACTIVE | Noted: 2020-09-12

## 2020-09-12 PROBLEM — R19.7 DIARRHEA OF PRESUMED INFECTIOUS ORIGIN: Status: ACTIVE | Noted: 2020-09-12

## 2020-09-13 PROBLEM — R19.7 DIARRHEA OF PRESUMED INFECTIOUS ORIGIN: Status: RESOLVED | Noted: 2020-09-12 | Resolved: 2020-09-13

## 2020-11-04 PROBLEM — R55 SYNCOPE: Status: RESOLVED | Noted: 2018-03-13 | Resolved: 2020-11-04

## 2021-03-22 PROBLEM — E83.52 HYPERCALCEMIA: Status: RESOLVED | Noted: 2017-09-22 | Resolved: 2021-03-22

## 2021-03-22 PROBLEM — R55 SYNCOPE: Status: RESOLVED | Noted: 2018-03-13 | Resolved: 2021-03-22

## 2021-03-22 PROBLEM — R13.10 DYSPHAGIA: Status: RESOLVED | Noted: 2017-08-09 | Resolved: 2021-03-22

## 2021-03-22 PROBLEM — R10.9 ABDOMINAL PAIN: Status: RESOLVED | Noted: 2017-09-21 | Resolved: 2021-03-22

## 2021-03-22 PROBLEM — E87.5 HYPERKALEMIA: Status: RESOLVED | Noted: 2020-09-12 | Resolved: 2021-03-22

## 2021-03-22 PROBLEM — N18.31 STAGE 3A CHRONIC KIDNEY DISEASE: Status: ACTIVE | Noted: 2021-03-22

## 2021-07-01 ENCOUNTER — PATIENT MESSAGE (OUTPATIENT)
Dept: ADMINISTRATIVE | Facility: OTHER | Age: 81
End: 2021-07-01

## 2022-03-09 PROBLEM — E66.01 SEVERE OBESITY (BMI 35.0-39.9) WITH COMORBIDITY: Status: ACTIVE | Noted: 2022-03-09

## 2025-02-13 NOTE — PROGRESS NOTES
"Subjective:       Patient ID: Elías Muñoz is a 78 y.o. female.    Vitals:  height is 5' 8" (1.727 m) and weight is 92.5 kg (204 lb). Her oral temperature is 97.7 °F (36.5 °C). Her blood pressure is 112/70 and her pulse is 94. Her oxygen saturation is 97%.     Chief Complaint: Sinus Problem    Sinus Problem   This is a new problem. Episode onset: 2 weeks ago  The problem has been gradually worsening since onset. There has been no fever. The fever has been present for less than 1 day. Her pain is at a severity of 7/10. The pain is moderate. Associated symptoms include chills, congestion, coughing, diaphoresis and sneezing. Pertinent negatives include no ear pain, headaches, hoarse voice, neck pain, shortness of breath, sinus pressure, sore throat or swollen glands. Past treatments include nothing. The treatment provided no relief.       Constitution: Positive for chills, sweating and fatigue. Negative for fever.   HENT: Positive for congestion. Negative for ear pain, sinus pain, sinus pressure, sore throat and voice change.    Neck: Negative for neck pain and painful lymph nodes.   Eyes: Negative for eye redness.   Respiratory: Positive for chest tightness, cough and sputum production. Negative for bloody sputum, COPD, shortness of breath, stridor, wheezing and asthma.    Gastrointestinal: Negative for nausea, vomiting and diarrhea.   Musculoskeletal: Negative for muscle ache.   Skin: Negative for rash.   Allergic/Immunologic: Positive for sneezing. Negative for seasonal allergies and asthma.   Neurological: Negative for headaches.   Hematologic/Lymphatic: Negative for swollen lymph nodes.       Objective:      Physical Exam   Constitutional: She is oriented to person, place, and time. She appears well-developed and well-nourished. She is cooperative.  Non-toxic appearance. She does not appear ill. No distress.   HENT:   Head: Normocephalic and atraumatic.   Right Ear: Hearing, tympanic membrane, external " Detail Level: Detailed ear and ear canal normal.   Left Ear: Hearing, tympanic membrane, external ear and ear canal normal.   Nose: No mucosal edema, rhinorrhea or nasal deformity. No epistaxis. Right sinus exhibits maxillary sinus tenderness and frontal sinus tenderness. Left sinus exhibits maxillary sinus tenderness and frontal sinus tenderness.   Mouth/Throat: Uvula is midline and mucous membranes are normal. No trismus in the jaw. Normal dentition. No uvula swelling. Posterior oropharyngeal edema and posterior oropharyngeal erythema present.   Eyes: Conjunctivae and lids are normal. No scleral icterus.   Neck: Trachea normal, full passive range of motion without pain and phonation normal. Neck supple.   Cardiovascular: Normal rate, regular rhythm, normal heart sounds, intact distal pulses and normal pulses.   Pulmonary/Chest: Effort normal and breath sounds normal. No respiratory distress.   Abdominal: Soft. Normal appearance and bowel sounds are normal. She exhibits no distension. There is no tenderness.   Musculoskeletal: Normal range of motion. She exhibits no edema or deformity.   Neurological: She is alert and oriented to person, place, and time. She exhibits normal muscle tone. Coordination normal.   Skin: Skin is warm, dry and intact. She is not diaphoretic. No pallor.   Psychiatric: She has a normal mood and affect. Her speech is normal and behavior is normal. Judgment and thought content normal. Cognition and memory are normal.   Nursing note and vitals reviewed.      Assessment:       1. Acute pharyngitis, unspecified etiology    2. Acute non-recurrent pansinusitis        Plan:         Acute pharyngitis, unspecified etiology    Acute non-recurrent pansinusitis  -     cefdinir (OMNICEF) 300 MG capsule; Take 1 capsule (300 mg total) by mouth 2 (two) times daily. for 10 days  Dispense: 20 capsule; Refill: 0  -     dexchlorpheniramin-pseudoephed (RESCON) 2-60 mg Tab; Take 1 tablet by mouth 2 (two) times daily as needed.   Dispense: 20 tablet; Refill: 0  -     naproxen (NAPROSYN) 500 MG tablet; Take 1 tablet (500 mg total) by mouth 2 (two) times daily with meals.  Dispense: 20 tablet; Refill: 0    Please drink plenty of fluids.  Please get plenty of rest.  Please return here or go to the Emergency Department for any concerns or worsening of condition.  If you were given wait & see antibiotics, please wait 3-5 days before taking them, and only take them if your symptoms have worsened or not improved.  If you do begin taking the antibiotics, please take them to completion.  If you were prescribed antibiotics, please take them to completion.  If you were prescribed a narcotic medication, do not drive or operate heavy equipment or machinery while taking these medications.    You were given a decongestant (RESCON or POLY VENT Dm).  If your insurance does not cover it or you cannot afford it, it is ok to use the over the counter products listed below.  If you do not have Hypertension or any history of palpitations, it is ok to take over the counter Sudafed or Mucinex D or Allegra-D or Claritin-D or Zyrtec-D.  If you do take one of the above, it is ok to combine that with plain over the counter Mucinex or Allegra or Claritin or Zyrtec.  If for example you are taking Zyrtec -D, you can combine that with Mucinex, but not Mucinex-D.  If you are taking Mucinex-D, you can combine that with plain Allegra or Claritin or Zyrtec.   If you do have Hypertension or palpitations, it is safe to take Coricidin HBP for relief of sinus symptoms.    We recommend you take over the counter Flonase (Fluticasone) or another nasally inhaled steroid unless you are already taking one.  Nasal irrigation with a saline spray or Netti Pot like device per their directions is also recommended.  If not allergic, please take over the counter Tylenol (Acetaminophen) and/or Motrin (Ibuprofen) as directed for control of pain and/or fever.    Robitussin DM 2 teas every 4 hours  Quality 130: Documentation Of Current Medications In The Medical Record: Current Medications Documented Quality 431: Preventive Care And Screening: Unhealthy Alcohol Use - Screening: Patient not identified as an unhealthy alcohol user when screened for unhealthy alcohol use using a systematic screening method as needed for cough.  If you  smoke, please stop smoking.    Please follow up with your primary care doctor or specialist as needed.  Calin Casas MD  [phone]            Quality 47: Advance Care Plan: Advance Care Planning discussed and documented in the medical record; patient did not wish or was not able to name a surrogate decision maker or provide an advance care plan. Quality 226: Preventive Care And Screening: Tobacco Use: Screening And Cessation Intervention: Patient screened for tobacco use and is an ex/non-smoker